# Patient Record
Sex: FEMALE | Race: WHITE | NOT HISPANIC OR LATINO | Employment: OTHER | URBAN - METROPOLITAN AREA
[De-identification: names, ages, dates, MRNs, and addresses within clinical notes are randomized per-mention and may not be internally consistent; named-entity substitution may affect disease eponyms.]

---

## 2017-12-07 ENCOUNTER — DOCUMENTATION (OUTPATIENT)
Dept: OTHER | Facility: HOSPITAL | Age: 57
End: 2017-12-07

## 2017-12-07 NOTE — PROGRESS NOTES
I saw patient with Dr SORTO and her  - Dr SORTO reviewed pathology report and surgical options as well as aliyah-adjuvant treatment- Patient imaging and biopsy was done at [facility] - HER2 is pending. The patient is preferring Mastectomy with Reconstruction - I reviewed the educational and supportive materials with the patient and completed arm measurement - given to SWAPNA Urban for EMR. Patient agreed to GRAIL Study.

## 2017-12-07 NOTE — PROGRESS NOTES
I called patient to let her know that I spoke to genetics and she does not need to see them - she said that she saw Dr Tirado at  and wants to do aliyah-adjuvant treatment so that she knows if the chemo is working or not. She has appointment to see Dr. Hudson on 12/14/17 - I will call Dr Hudson office tomorrow to see if she can be seen earlier.  AJ aware of all this.

## 2017-12-12 ENCOUNTER — CONSULT (OUTPATIENT)
Dept: ONCOLOGY | Facility: CLINIC | Age: 57
End: 2017-12-12

## 2017-12-12 VITALS
DIASTOLIC BLOOD PRESSURE: 62 MMHG | HEIGHT: 64 IN | BODY MASS INDEX: 21.17 KG/M2 | WEIGHT: 124 LBS | RESPIRATION RATE: 12 BRPM | TEMPERATURE: 97.7 F | HEART RATE: 76 BPM | SYSTOLIC BLOOD PRESSURE: 84 MMHG

## 2017-12-12 DIAGNOSIS — Z17.0 MALIGNANT NEOPLASM OF LOWER-OUTER QUADRANT OF RIGHT BREAST OF FEMALE, ESTROGEN RECEPTOR POSITIVE (HCC): Primary | ICD-10-CM

## 2017-12-12 DIAGNOSIS — C50.511 MALIGNANT NEOPLASM OF LOWER-OUTER QUADRANT OF RIGHT BREAST OF FEMALE, ESTROGEN RECEPTOR POSITIVE (HCC): Primary | ICD-10-CM

## 2017-12-12 PROBLEM — C50.519 MALIGNANT NEOPLASM OF LOWER-OUTER QUADRANT OF BREAST OF FEMALE, ESTROGEN RECEPTOR POSITIVE (HCC): Status: ACTIVE | Noted: 2017-12-12

## 2017-12-12 PROCEDURE — 99245 OFF/OP CONSLTJ NEW/EST HI 55: CPT | Performed by: INTERNAL MEDICINE

## 2017-12-12 NOTE — PROGRESS NOTES
CHIEF COMPLAINT: Management of early stage breast cancer    REASON FOR REFERRAL: Early stage breast cancer management      RECORDS OBTAINED  Records of the patients history including those obtained from  Dr. SORTO were reviewed and summarized in detail.       Malignant neoplasm of lower-outer quadrant of breast of female, estrogen receptor positive    11/28/2017 Biopsy     Right 8:00 fibrocystic breast disease with atypical ductal hyperplasia         11/29/2017 Imaging     Bilateral breast MRI showed one area of abnormality were the atypical ductal hyperplasia was found but a second area of concern was also seen and MRI guided biopsy was suggested.  There was one prominent axillary lymph node and a contralateral subcutaneous abnormality favored to be an intramammary node or an adenoma but ultrasound was suggested         11/30/2017 Biopsy     MRI guided right axillary lymph node positive for 1 cm ductal carcinoma with apocrine features.  At the 8 o'clock position in the right breast there was a grade 3 infiltrating ductal carcinoma with apocrine features and focal high-grade ductal carcinoma in situ.  Estrogen receptor was 50% positive in both the invasive and intraductal component.  CT was negative.  HER-2/allison was negative by fish.         12/12/2017 Initial Diagnosis     Malignant neoplasm of lower-outer quadrant of breast of female, estrogen receptor positive       12/12/2017 -  Research Study Participant     She previously consented to the Grail study            HISTORY OF PRESENT ILLNESS:  The patient is a 57 y.o.  female, referred for Management of early-stage breast cancer.  She had felt a mass in that right breast for 3 years with negative images but had been watched every 6 months and there was enough change in this rate sized abnormality that was causing a serous discharge daily for the last year or so that biopsy was performed.  The initial biopsy showed atypical ductal hyperplasia but an MRI the following  "day showed not only that area but a second area of concern that by MRI guidance showed a single axillary node involved that was positive for ductal carcinoma with apocrine features 1 cm in size.  In the 8 o'clock position in the right breast there was a grade 3 infiltrating ductal carcinoma with apocrine features 0.7 cm size focal high-grade DCIS ER 50% positive in both the invasive and in situ component and the progesterone receptor was negative and the HER-2/allison was negative by fish.  She comes today for discussion of adjuvant versus neoadjuvant therapy, type of surgery, radiation or not, type of hormonal manipulation, etc.  No somatic complaints to suggest metastasis.    REVIEW OF SYSTEMS:  A 14 point review of systems was performed and is negative except as noted above.    History reviewed. No pertinent past medical history.  History reviewed. No pertinent surgical history.    No current outpatient prescriptions on file prior to visit.     No current facility-administered medications on file prior to visit.        No Known Allergies    Social History     Social History   • Marital status:      Spouse name: N/A   • Number of children: N/A   • Years of education: N/A     Social History Main Topics   • Smoking status: Never Smoker   • Smokeless tobacco: None   • Alcohol use None   • Drug use: None   • Sexual activity: Not Asked     Other Topics Concern   • None     Social History Narrative   • None       Family History   Problem Relation Age of Onset   • Breast cancer Mother    • Diabetes Mother    • Hypertension Mother    • Diabetes Father        PHYSICAL EXAM:    BP (!) 84/62  Pulse 76  Temp 97.7 °F (36.5 °C)  Resp 12  Ht 161.3 cm (63.5\")  Wt 56.2 kg (124 lb)  BMI 21.62 kg/m2    ECOG: (0) Fully active, able to carry on all predisease performance without restriction  General: well appearing female in no acute distress  HEENT: sclera anicteric, oropharynx clear  Lymphatics: no cervical, " supraclavicular, inguinal, or axillary adenopathy  Cardiovascular: regular rate and rhythm, no murmurs  Neck: Supple; No thyromegaly  Lungs: clear to auscultation bilaterally. No respiratory distress.   Abdomen: soft, nontender, nondistended.  No palpable organomegaly  Extremities: no cyanosis, clubbing, edema, or cords  Skin: no rashes, lesions, bruising, or petechiae  Neuro: Alert and oriented x 4; Moving all extremities.  Psych: No anxiety or depression  Breasts: Right breast 8:00 6 cm area of mass/hematoma  No results found for any previous visit.    Assessment/Plan     1. Clinical Stage IIa T1b N1 M0 poorly differentiated grade 3 infiltrating ductal carcinoma with apocrine features and ductal carcinoma in situ single lymph node positive on biopsy and imaging    Discussion: We discussed her options for 90 minutes.  Neoadjuvant therapy it would be reasonable in her case primarily to guide us as to whether to add Xeloda following standard Adriamycin Cytoxan Taxol if she did not have a pathological CR.  His based on data from the study published this past summer [employer] published in the [place] Journal which was a phase 3 study and HER-2/allison negative patients randomized to receive Xeloda if they had residual invasive cancer after the receipt of standard neoadjuvant Adriamycin taxanes or both.  In that study, patients who failed to have a complete pathological response had a 20-30% risk of relapse compared to 13-22% with a pathological CR.  The addition of Xeloda rendered a 74% disease free survival compared to 68% in the control arm and overall survival was 89% versus 84%.  Based on discussions by phone today with Dr. SORTO, she is probably prone towards mastectomy regardless and hence there is no indication for neoadjuvant therapy for breast conservation purposes but I would argue for neoadjuvant therapy to guide the Xeloda decision.  On the other hand, based on the MINDACT trial she could consider surgery  followed by Mammaprint if she had 1-3 positive nodes and the tumor was in fact less than 3 cm in size(there is significant mass in the 8 o'clock position of the right breast now but it's hard to know how much of that is the cancer versus postbiopsy hematoma).  If she had a low risk Mammaprint then she might forego chemotherapy.  While I would prefer my initial suggestion above, I am at peace with her decision to proceed with surgery first and then base further decisions on Mammaprint on that specimen if that is her wish.  She will certainly need 5 and perhaps 10 years of Arimidex (she has a uterus in place and is postmenopausal times several years).  We can later do a breast cancer index to help guide the need for extended adjuvant but I would wait and see how she handled the hormonal blockade before running at as, were hormonal blockade horribly punitive then that becomes less of a feasible option for her.  I will check her baseline chemistry and CBC today but otherwise, in the absence of symptoms, NCCN guidelines do not suggest routine staging studies in the setting.  She will need radiation postoperatively.  She does need an axillary node dissection.  She does have a mother with breast cancer but her information was passed through our genetic counselors and it was deemed that she would not be appropriate for genetic testing.  She has signed consent for submission of her tissue for the study.  I will see her back postoperatively to talk further about subsequent adjuvant therapies based on the results of subsequent findings pathologically.  She sees plastic surgery this afternoon.  She is contemplating bilateral mastectomies.  If she proceeds with that than she needs no further preoperative breast imaging but if she plans on doing a right mastectomy with out prophylactic left mastectomy (a prospect which I told her was entirely appropriate) then we need to get ultrasound of the contralateral breast before the right  mastectomy and I asked her to reinforce to Dr. SORTO the need for that and I spoken with him by phone today while she was in the room regarding that issue.  The MRI report is highly suggestive that this is a benign process in the left breast but ultrasound was nonetheless recommended and needs to be completed if that left breast is not taken at the time of surgery.  Radhames Hudson MD    12/12/2017

## 2017-12-18 ENCOUNTER — TRANSCRIBE ORDERS (OUTPATIENT)
Dept: ADMINISTRATIVE | Facility: HOSPITAL | Age: 57
End: 2017-12-18

## 2017-12-18 ENCOUNTER — APPOINTMENT (OUTPATIENT)
Dept: PREADMISSION TESTING | Facility: HOSPITAL | Age: 57
End: 2017-12-18

## 2017-12-18 VITALS — HEIGHT: 62 IN | BODY MASS INDEX: 22.92 KG/M2 | WEIGHT: 124.56 LBS

## 2017-12-18 DIAGNOSIS — C50.511 MALIGNANT NEOPLASM OF LOWER-OUTER QUADRANT OF RIGHT FEMALE BREAST, UNSPECIFIED ESTROGEN RECEPTOR STATUS (HCC): Primary | ICD-10-CM

## 2017-12-18 LAB
ALBUMIN SERPL-MCNC: 4.3 G/DL (ref 3.2–4.8)
ALBUMIN/GLOB SERPL: 1.6 G/DL (ref 1.5–2.5)
ALP SERPL-CCNC: 67 U/L (ref 25–100)
ALT SERPL W P-5'-P-CCNC: 13 U/L (ref 7–40)
ANION GAP SERPL CALCULATED.3IONS-SCNC: 6 MMOL/L (ref 3–11)
AST SERPL-CCNC: 14 U/L (ref 0–33)
BILIRUB SERPL-MCNC: 0.4 MG/DL (ref 0.3–1.2)
BUN BLD-MCNC: 12 MG/DL (ref 9–23)
BUN/CREAT SERPL: 15 (ref 7–25)
CALCIUM SPEC-SCNC: 9.2 MG/DL (ref 8.7–10.4)
CHLORIDE SERPL-SCNC: 103 MMOL/L (ref 99–109)
CO2 SERPL-SCNC: 31 MMOL/L (ref 20–31)
CREAT BLD-MCNC: 0.8 MG/DL (ref 0.6–1.3)
DEPRECATED RDW RBC AUTO: 41.5 FL (ref 37–54)
ERYTHROCYTE [DISTWIDTH] IN BLOOD BY AUTOMATED COUNT: 12.9 % (ref 11.3–14.5)
GFR SERPL CREATININE-BSD FRML MDRD: 74 ML/MIN/1.73
GLOBULIN UR ELPH-MCNC: 2.7 GM/DL
GLUCOSE BLD-MCNC: 132 MG/DL (ref 70–100)
HCT VFR BLD AUTO: 41.1 % (ref 34.5–44)
HGB BLD-MCNC: 13.3 G/DL (ref 11.5–15.5)
MCH RBC QN AUTO: 28.9 PG (ref 27–31)
MCHC RBC AUTO-ENTMCNC: 32.4 G/DL (ref 32–36)
MCV RBC AUTO: 89.2 FL (ref 80–99)
PLATELET # BLD AUTO: 280 10*3/MM3 (ref 150–450)
PMV BLD AUTO: 9.6 FL (ref 6–12)
POTASSIUM BLD-SCNC: 4.2 MMOL/L (ref 3.5–5.5)
PROT SERPL-MCNC: 7 G/DL (ref 5.7–8.2)
RBC # BLD AUTO: 4.61 10*6/MM3 (ref 3.89–5.14)
SODIUM BLD-SCNC: 140 MMOL/L (ref 132–146)
WBC NRBC COR # BLD: 6.51 10*3/MM3 (ref 3.5–10.8)

## 2017-12-18 PROCEDURE — 36415 COLL VENOUS BLD VENIPUNCTURE: CPT

## 2017-12-18 PROCEDURE — 80053 COMPREHEN METABOLIC PANEL: CPT | Performed by: SURGERY

## 2017-12-18 PROCEDURE — 85027 COMPLETE CBC AUTOMATED: CPT | Performed by: SURGERY

## 2017-12-19 ENCOUNTER — ANESTHESIA EVENT (OUTPATIENT)
Dept: PERIOP | Facility: HOSPITAL | Age: 57
End: 2017-12-19

## 2017-12-19 ENCOUNTER — ANESTHESIA (OUTPATIENT)
Dept: PERIOP | Facility: HOSPITAL | Age: 57
End: 2017-12-19

## 2017-12-19 ENCOUNTER — HOSPITAL ENCOUNTER (OUTPATIENT)
Facility: HOSPITAL | Age: 57
Discharge: HOME | End: 2017-12-20
Attending: SURGERY | Admitting: SURGERY

## 2017-12-19 ENCOUNTER — HOSPITAL ENCOUNTER (OUTPATIENT)
Dept: NUCLEAR MEDICINE | Facility: HOSPITAL | Age: 57
Discharge: HOME | End: 2017-12-19
Attending: SURGERY

## 2017-12-19 DIAGNOSIS — C50.511 MALIGNANT NEOPLASM OF LOWER-OUTER QUADRANT OF RIGHT FEMALE BREAST, UNSPECIFIED ESTROGEN RECEPTOR STATUS (HCC): ICD-10-CM

## 2017-12-19 DIAGNOSIS — C50.919 BREAST CA (HCC): ICD-10-CM

## 2017-12-19 LAB
B-HCG UR QL: NEGATIVE
INTERNAL NEGATIVE CONTROL: NORMAL
INTERNAL POSITIVE CONTROL: REACTIVE
Lab: NORMAL

## 2017-12-19 PROCEDURE — 88307 TISSUE EXAM BY PATHOLOGIST: CPT | Performed by: SURGERY

## 2017-12-19 PROCEDURE — 25010000002 BUPRENORPHINE PER 0.1 MG: Performed by: NURSE ANESTHETIST, CERTIFIED REGISTERED

## 2017-12-19 PROCEDURE — 25010000002 FENTANYL CITRATE (PF) 100 MCG/2ML SOLUTION: Performed by: NURSE ANESTHETIST, CERTIFIED REGISTERED

## 2017-12-19 PROCEDURE — 25010000003 CEFAZOLIN IN DEXTROSE 2-4 GM/100ML-% SOLUTION: Performed by: SURGERY

## 2017-12-19 PROCEDURE — 25010000002 ONDANSETRON PER 1 MG: Performed by: NURSE ANESTHETIST, CERTIFIED REGISTERED

## 2017-12-19 PROCEDURE — 88360 TUMOR IMMUNOHISTOCHEM/MANUAL: CPT | Performed by: SURGERY

## 2017-12-19 PROCEDURE — 25010000002 PROPOFOL 1000 MG/ML EMULSION: Performed by: NURSE ANESTHETIST, CERTIFIED REGISTERED

## 2017-12-19 PROCEDURE — 25010000002 PROPOFOL 10 MG/ML EMULSION: Performed by: NURSE ANESTHETIST, CERTIFIED REGISTERED

## 2017-12-19 PROCEDURE — 25010000002 DEXAMETHASONE PER 1 MG: Performed by: NURSE ANESTHETIST, CERTIFIED REGISTERED

## 2017-12-19 PROCEDURE — 88305 TISSUE EXAM BY PATHOLOGIST: CPT | Performed by: SURGERY

## 2017-12-19 PROCEDURE — 25010000002 DEXAMETHASONE SODIUM PHOSPHATE 10 MG/ML SOLUTION 1 ML VIAL: Performed by: NURSE ANESTHETIST, CERTIFIED REGISTERED

## 2017-12-19 PROCEDURE — 25010000002 MIDAZOLAM PER 1 MG: Performed by: NURSE ANESTHETIST, CERTIFIED REGISTERED

## 2017-12-19 PROCEDURE — C1789 PROSTHESIS, BREAST, IMP: HCPCS | Performed by: SURGERY

## 2017-12-19 PROCEDURE — 25010000002 NEOSTIGMINE 10 MG/10ML SOLUTION: Performed by: NURSE ANESTHETIST, CERTIFIED REGISTERED

## 2017-12-19 DEVICE — IMPLANTABLE DEVICE: Type: IMPLANTABLE DEVICE | Site: BREAST | Status: FUNCTIONAL

## 2017-12-19 DEVICE — GRFT TISS ALLODERM RTM PERF CONTR 132SQ/CM THN MD: Type: IMPLANTABLE DEVICE | Site: BREAST | Status: FUNCTIONAL

## 2017-12-19 RX ORDER — SODIUM CHLORIDE, SODIUM LACTATE, POTASSIUM CHLORIDE, CALCIUM CHLORIDE 600; 310; 30; 20 MG/100ML; MG/100ML; MG/100ML; MG/100ML
9 INJECTION, SOLUTION INTRAVENOUS CONTINUOUS
Status: DISCONTINUED | OUTPATIENT
Start: 2017-12-19 | End: 2017-12-20 | Stop reason: HOSPADM

## 2017-12-19 RX ORDER — OXYCODONE HYDROCHLORIDE 5 MG/1
5 TABLET ORAL EVERY 4 HOURS PRN
Status: DISCONTINUED | OUTPATIENT
Start: 2017-12-19 | End: 2017-12-20 | Stop reason: HOSPADM

## 2017-12-19 RX ORDER — PROMETHAZINE HYDROCHLORIDE 25 MG/ML
6.25 INJECTION, SOLUTION INTRAMUSCULAR; INTRAVENOUS ONCE AS NEEDED
Status: DISCONTINUED | OUTPATIENT
Start: 2017-12-19 | End: 2017-12-19 | Stop reason: HOSPADM

## 2017-12-19 RX ORDER — DIPHENHYDRAMINE HYDROCHLORIDE 50 MG/ML
12.5 INJECTION INTRAMUSCULAR; INTRAVENOUS EVERY 6 HOURS PRN
Status: DISCONTINUED | OUTPATIENT
Start: 2017-12-19 | End: 2017-12-20 | Stop reason: HOSPADM

## 2017-12-19 RX ORDER — ONDANSETRON 2 MG/ML
INJECTION INTRAMUSCULAR; INTRAVENOUS AS NEEDED
Status: DISCONTINUED | OUTPATIENT
Start: 2017-12-19 | End: 2017-12-19 | Stop reason: SURG

## 2017-12-19 RX ORDER — MAGNESIUM HYDROXIDE 1200 MG/15ML
LIQUID ORAL AS NEEDED
Status: DISCONTINUED | OUTPATIENT
Start: 2017-12-19 | End: 2017-12-19 | Stop reason: HOSPADM

## 2017-12-19 RX ORDER — SCOLOPAMINE TRANSDERMAL SYSTEM 1 MG/1
1 PATCH, EXTENDED RELEASE TRANSDERMAL ONCE
Status: DISCONTINUED | OUTPATIENT
Start: 2017-12-19 | End: 2017-12-19

## 2017-12-19 RX ORDER — GLYCOPYRROLATE 0.2 MG/ML
INJECTION INTRAMUSCULAR; INTRAVENOUS AS NEEDED
Status: DISCONTINUED | OUTPATIENT
Start: 2017-12-19 | End: 2017-12-19 | Stop reason: SURG

## 2017-12-19 RX ORDER — PROMETHAZINE HYDROCHLORIDE 25 MG/1
25 SUPPOSITORY RECTAL ONCE AS NEEDED
Status: DISCONTINUED | OUTPATIENT
Start: 2017-12-19 | End: 2017-12-19 | Stop reason: HOSPADM

## 2017-12-19 RX ORDER — SODIUM CHLORIDE 9 MG/ML
INJECTION, SOLUTION INTRAVENOUS AS NEEDED
Status: DISCONTINUED | OUTPATIENT
Start: 2017-12-19 | End: 2017-12-19 | Stop reason: HOSPADM

## 2017-12-19 RX ORDER — SODIUM CHLORIDE 9 MG/ML
75 INJECTION, SOLUTION INTRAVENOUS CONTINUOUS
Status: DISCONTINUED | OUTPATIENT
Start: 2017-12-19 | End: 2017-12-20 | Stop reason: HOSPADM

## 2017-12-19 RX ORDER — ACETAMINOPHEN 500 MG
1000 TABLET ORAL EVERY 6 HOURS
Status: DISCONTINUED | OUTPATIENT
Start: 2017-12-19 | End: 2017-12-20 | Stop reason: HOSPADM

## 2017-12-19 RX ORDER — LIDOCAINE HYDROCHLORIDE 10 MG/ML
INJECTION, SOLUTION EPIDURAL; INFILTRATION; INTRACAUDAL; PERINEURAL AS NEEDED
Status: DISCONTINUED | OUTPATIENT
Start: 2017-12-19 | End: 2017-12-19 | Stop reason: SURG

## 2017-12-19 RX ORDER — LIDOCAINE HYDROCHLORIDE 10 MG/ML
0.5 INJECTION, SOLUTION EPIDURAL; INFILTRATION; INTRACAUDAL; PERINEURAL ONCE AS NEEDED
Status: DISCONTINUED | OUTPATIENT
Start: 2017-12-19 | End: 2017-12-19 | Stop reason: HOSPADM

## 2017-12-19 RX ORDER — ESMOLOL HYDROCHLORIDE 10 MG/ML
INJECTION INTRAVENOUS AS NEEDED
Status: DISCONTINUED | OUTPATIENT
Start: 2017-12-19 | End: 2017-12-19 | Stop reason: SURG

## 2017-12-19 RX ORDER — PROPOFOL 10 MG/ML
VIAL (ML) INTRAVENOUS AS NEEDED
Status: DISCONTINUED | OUTPATIENT
Start: 2017-12-19 | End: 2017-12-19 | Stop reason: SURG

## 2017-12-19 RX ORDER — ROCURONIUM BROMIDE 10 MG/ML
INJECTION, SOLUTION INTRAVENOUS AS NEEDED
Status: DISCONTINUED | OUTPATIENT
Start: 2017-12-19 | End: 2017-12-19 | Stop reason: SURG

## 2017-12-19 RX ORDER — CEFAZOLIN SODIUM 2 G/100ML
2 INJECTION, SOLUTION INTRAVENOUS EVERY 8 HOURS
Status: COMPLETED | OUTPATIENT
Start: 2017-12-19 | End: 2017-12-20

## 2017-12-19 RX ORDER — HYDROMORPHONE HYDROCHLORIDE 1 MG/ML
0.5 INJECTION, SOLUTION INTRAMUSCULAR; INTRAVENOUS; SUBCUTANEOUS
Status: DISCONTINUED | OUTPATIENT
Start: 2017-12-19 | End: 2017-12-19 | Stop reason: HOSPADM

## 2017-12-19 RX ORDER — PREGABALIN 75 MG/1
75 CAPSULE ORAL ONCE
Status: COMPLETED | OUTPATIENT
Start: 2017-12-19 | End: 2017-12-19

## 2017-12-19 RX ORDER — BUPIVACAINE HYDROCHLORIDE 2.5 MG/ML
INJECTION, SOLUTION EPIDURAL; INFILTRATION; INTRACAUDAL AS NEEDED
Status: DISCONTINUED | OUTPATIENT
Start: 2017-12-19 | End: 2017-12-19 | Stop reason: HOSPADM

## 2017-12-19 RX ORDER — CARISOPRODOL 350 MG/1
350 TABLET ORAL EVERY 8 HOURS PRN
Status: DISCONTINUED | OUTPATIENT
Start: 2017-12-19 | End: 2017-12-20 | Stop reason: HOSPADM

## 2017-12-19 RX ORDER — FAMOTIDINE 10 MG/ML
20 INJECTION, SOLUTION INTRAVENOUS ONCE
Status: CANCELLED | OUTPATIENT
Start: 2017-12-19 | End: 2017-12-19

## 2017-12-19 RX ORDER — PROMETHAZINE HYDROCHLORIDE 25 MG/1
25 TABLET ORAL ONCE AS NEEDED
Status: DISCONTINUED | OUTPATIENT
Start: 2017-12-19 | End: 2017-12-19 | Stop reason: HOSPADM

## 2017-12-19 RX ORDER — DEXAMETHASONE SODIUM PHOSPHATE 4 MG/ML
INJECTION, SOLUTION INTRA-ARTICULAR; INTRALESIONAL; INTRAMUSCULAR; INTRAVENOUS; SOFT TISSUE AS NEEDED
Status: DISCONTINUED | OUTPATIENT
Start: 2017-12-19 | End: 2017-12-19 | Stop reason: SURG

## 2017-12-19 RX ORDER — MIDAZOLAM HYDROCHLORIDE 1 MG/ML
INJECTION INTRAMUSCULAR; INTRAVENOUS AS NEEDED
Status: DISCONTINUED | OUTPATIENT
Start: 2017-12-19 | End: 2017-12-19 | Stop reason: SURG

## 2017-12-19 RX ORDER — FAMOTIDINE 20 MG/1
20 TABLET, FILM COATED ORAL ONCE
Status: COMPLETED | OUTPATIENT
Start: 2017-12-19 | End: 2017-12-19

## 2017-12-19 RX ORDER — HYDROMORPHONE HYDROCHLORIDE 1 MG/ML
0.3 INJECTION, SOLUTION INTRAMUSCULAR; INTRAVENOUS; SUBCUTANEOUS
Status: DISCONTINUED | OUTPATIENT
Start: 2017-12-19 | End: 2017-12-20 | Stop reason: HOSPADM

## 2017-12-19 RX ORDER — PROMETHAZINE HYDROCHLORIDE 25 MG/ML
6.25 INJECTION, SOLUTION INTRAMUSCULAR; INTRAVENOUS EVERY 6 HOURS PRN
Status: DISCONTINUED | OUTPATIENT
Start: 2017-12-19 | End: 2017-12-20 | Stop reason: HOSPADM

## 2017-12-19 RX ORDER — NALOXONE HCL 0.4 MG/ML
0.1 VIAL (ML) INJECTION
Status: DISCONTINUED | OUTPATIENT
Start: 2017-12-19 | End: 2017-12-20 | Stop reason: HOSPADM

## 2017-12-19 RX ORDER — FENTANYL CITRATE 50 UG/ML
50 INJECTION, SOLUTION INTRAMUSCULAR; INTRAVENOUS
Status: DISCONTINUED | OUTPATIENT
Start: 2017-12-19 | End: 2017-12-19 | Stop reason: HOSPADM

## 2017-12-19 RX ORDER — CELECOXIB 200 MG/1
200 CAPSULE ORAL ONCE
Status: COMPLETED | OUTPATIENT
Start: 2017-12-19 | End: 2017-12-19

## 2017-12-19 RX ORDER — NEOSTIGMINE METHYLSULFATE 1 MG/ML
INJECTION, SOLUTION INTRAVENOUS AS NEEDED
Status: DISCONTINUED | OUTPATIENT
Start: 2017-12-19 | End: 2017-12-19 | Stop reason: SURG

## 2017-12-19 RX ORDER — CELECOXIB 200 MG/1
200 CAPSULE ORAL EVERY 12 HOURS
Status: DISCONTINUED | OUTPATIENT
Start: 2017-12-19 | End: 2017-12-20 | Stop reason: HOSPADM

## 2017-12-19 RX ORDER — ONDANSETRON 2 MG/ML
4 INJECTION INTRAMUSCULAR; INTRAVENOUS ONCE AS NEEDED
Status: DISCONTINUED | OUTPATIENT
Start: 2017-12-19 | End: 2017-12-19 | Stop reason: HOSPADM

## 2017-12-19 RX ORDER — SODIUM CHLORIDE 0.9 % (FLUSH) 0.9 %
1-10 SYRINGE (ML) INJECTION AS NEEDED
Status: DISCONTINUED | OUTPATIENT
Start: 2017-12-19 | End: 2017-12-19 | Stop reason: HOSPADM

## 2017-12-19 RX ORDER — CEFAZOLIN SODIUM 2 G/100ML
2 INJECTION, SOLUTION INTRAVENOUS ONCE
Status: COMPLETED | OUTPATIENT
Start: 2017-12-19 | End: 2017-12-19

## 2017-12-19 RX ORDER — ACETAMINOPHEN 500 MG
1000 TABLET ORAL ONCE
Status: COMPLETED | OUTPATIENT
Start: 2017-12-19 | End: 2017-12-19

## 2017-12-19 RX ORDER — ONDANSETRON 2 MG/ML
4 INJECTION INTRAMUSCULAR; INTRAVENOUS EVERY 6 HOURS PRN
Status: DISCONTINUED | OUTPATIENT
Start: 2017-12-19 | End: 2017-12-20 | Stop reason: HOSPADM

## 2017-12-19 RX ADMIN — PROPOFOL 188 MCG/KG/MIN: 10 INJECTION, EMULSION INTRAVENOUS at 07:09

## 2017-12-19 RX ADMIN — ACETAMINOPHEN 1000 MG: 500 TABLET ORAL at 21:05

## 2017-12-19 RX ADMIN — ACETAMINOPHEN 1000 MG: 500 TABLET ORAL at 16:02

## 2017-12-19 RX ADMIN — FAMOTIDINE 20 MG: 20 TABLET, FILM COATED ORAL at 06:40

## 2017-12-19 RX ADMIN — FENTANYL CITRATE 25 MCG: 50 INJECTION INTRAMUSCULAR; INTRAVENOUS at 12:49

## 2017-12-19 RX ADMIN — ROCURONIUM BROMIDE 40 MG: 10 INJECTION INTRAVENOUS at 07:08

## 2017-12-19 RX ADMIN — NEOSTIGMINE METHYLSULFATE 2 MG: 1 INJECTION, SOLUTION INTRAVENOUS at 10:02

## 2017-12-19 RX ADMIN — SCOPALAMINE 1 PATCH: 1 PATCH, EXTENDED RELEASE TRANSDERMAL at 06:39

## 2017-12-19 RX ADMIN — ESMOLOL HYDROCHLORIDE 20 MG: 10 INJECTION, SOLUTION INTRAVENOUS at 07:09

## 2017-12-19 RX ADMIN — FENTANYL CITRATE 50 MCG: 50 INJECTION INTRAMUSCULAR; INTRAVENOUS at 13:43

## 2017-12-19 RX ADMIN — ROCURONIUM BROMIDE 10 MG: 10 INJECTION INTRAVENOUS at 07:50

## 2017-12-19 RX ADMIN — DEXAMETHASONE SODIUM PHOSPHATE 4 MG: 4 INJECTION, SOLUTION INTRAMUSCULAR; INTRAVENOUS at 07:15

## 2017-12-19 RX ADMIN — DEXAMETHASONE SODIUM PHOSPHATE 30 ML: 10 INJECTION, SOLUTION INTRAMUSCULAR; INTRAVENOUS at 07:09

## 2017-12-19 RX ADMIN — CEFAZOLIN SODIUM 2 G: 2 INJECTION, SOLUTION INTRAVENOUS at 17:11

## 2017-12-19 RX ADMIN — SODIUM CHLORIDE, POTASSIUM CHLORIDE, SODIUM LACTATE AND CALCIUM CHLORIDE: 600; 310; 30; 20 INJECTION, SOLUTION INTRAVENOUS at 07:05

## 2017-12-19 RX ADMIN — ACETAMINOPHEN 1000 MG: 500 TABLET ORAL at 06:40

## 2017-12-19 RX ADMIN — CELECOXIB 200 MG: 200 CAPSULE ORAL at 16:02

## 2017-12-19 RX ADMIN — LIDOCAINE HYDROCHLORIDE 50 MG: 10 INJECTION, SOLUTION EPIDURAL; INFILTRATION; INTRACAUDAL; PERINEURAL at 07:08

## 2017-12-19 RX ADMIN — SODIUM CHLORIDE 75 ML/HR: 9 INJECTION, SOLUTION INTRAVENOUS at 23:23

## 2017-12-19 RX ADMIN — MIDAZOLAM HYDROCHLORIDE 2 MG: 1 INJECTION, SOLUTION INTRAMUSCULAR; INTRAVENOUS at 07:05

## 2017-12-19 RX ADMIN — OXYCODONE HYDROCHLORIDE 5 MG: 5 TABLET ORAL at 23:26

## 2017-12-19 RX ADMIN — FENTANYL CITRATE 25 MCG: 50 INJECTION INTRAMUSCULAR; INTRAVENOUS at 12:39

## 2017-12-19 RX ADMIN — PREGABALIN 75 MG: 75 CAPSULE ORAL at 06:40

## 2017-12-19 RX ADMIN — SODIUM CHLORIDE 75 ML/HR: 9 INJECTION, SOLUTION INTRAVENOUS at 11:57

## 2017-12-19 RX ADMIN — PROPOFOL 130 MG: 10 INJECTION, EMULSION INTRAVENOUS at 07:08

## 2017-12-19 RX ADMIN — CEFAZOLIN SODIUM 2 G: 2 INJECTION, SOLUTION INTRAVENOUS at 09:57

## 2017-12-19 RX ADMIN — CEFAZOLIN SODIUM 2 G: 2 INJECTION, SOLUTION INTRAVENOUS at 07:11

## 2017-12-19 RX ADMIN — CELECOXIB 200 MG: 200 CAPSULE ORAL at 06:50

## 2017-12-19 RX ADMIN — SODIUM CHLORIDE, POTASSIUM CHLORIDE, SODIUM LACTATE AND CALCIUM CHLORIDE: 600; 310; 30; 20 INJECTION, SOLUTION INTRAVENOUS at 09:20

## 2017-12-19 RX ADMIN — ONDANSETRON 4 MG: 2 INJECTION INTRAMUSCULAR; INTRAVENOUS at 09:57

## 2017-12-19 RX ADMIN — OXYCODONE HYDROCHLORIDE 5 MG: 5 TABLET ORAL at 15:22

## 2017-12-19 RX ADMIN — GLYCOPYRROLATE 0.2 MG: 0.2 INJECTION, SOLUTION INTRAMUSCULAR; INTRAVENOUS at 10:02

## 2017-12-19 NOTE — OP NOTE
Operative Note    Date of Surgery:  12/19/2017    Pre-Operative Diagnosis: Right breast cancer with axillary lymph node metastases    Post-Operative Diagnosis: Same    Procedure:  Right nipple sparing mastectomy and axillary node dissection    Anesthesia:  General    Surgeon:  Cristobal Luna MD    Assistant:  LORY Rivera    Estimated Blood Loss:  Very minimal    Complications: None      Indication for Procedure: Mrs. Burroughs is a healthy 57 years old lady who was recently diagnosed with grade 3 invasive ductal carcinoma in the right breast with concern about a large focus of DCIS as well as biopsy of the axillary lymph node that showed metastases.  She presents today for the above procedure with plans to proceed with immediate reconstruction by Dr. Butler.    Procedure: Patient was taken to the operative anesthesia and placed supine on the table.  Following induction of general ventricular anesthesia, a Duran catheter was placed.  She received 2 g of Kefzol IV.  Anesthesia placed ultrasound-guided pectoralis nerve block on the right side.  SCDs were placed.  The right breast, chest, axilla and upper arm were then prepped and draped in a sterile fashion.  Timeout was observed.  The lateral inframammary incision was made.  The superior flap including the areola nipple complex was dissected away from the breast tissue, maintaining adequate thickness of the skin.  An inferior flap was also raised. We then proceeded by removing the breast off the underlying pectoralis major muscle taking the fascia along with the specimen starting medially and proceeding laterally.  Some of the larger vessels of the vas were ligated with three-vessel suture.  Once in the axilla we appreciated larger nodes. Theses were removed en bloc with the specimen.  The specimen was marked with a long suture laterally and a short silk suture at the nipple site.  It was then sent fresh to pathology.  2 other palpable nodes were appreciated in the  axilla.  These were excised and sent for permanent section as well.  Note that the long thoracic and thoracodorsal nerves were preserved.  The wound was then irrigated with water with clear return of fluid noted.  The flaps and the nipple areolar comple were viable.  At this time Dr. Butler proceeded with immediate reconstruction.  The patient was doing relatively well with anesthesia.  Sponge count and needle count were correct at the end of the procedure.     Cristobal Luna MD  12/19/17  2:42 PM

## 2017-12-19 NOTE — OP NOTE
Jannette Burroughs  [id number]  1960    Date of Procedure: 12/19/2017    Preoperative Diagnosis: Acquired Absence of right breast, right breast cancer    Postoperative Diagnosis: same    Surgeon: Dottie Butler MD    Assistant: LORY Cruz    Anesthesia: General, pecs blocks    Estimated Blood Loss: less than 10ml    Complications: none      HPI/Indications: The patient is a 57 year old with right sided breast cancer who underwent bilateral right today and presents for immediate reconstruction. The risks, benefits and alternatives to all forms of breast recosntruction including but not limited to the risk of skin necrosis and infection were discussed with the aptient who verbalizes full understanding and wishes to proceed with immediate staged right breast recosntruction with expander and alloderm.      Description of the procedure/findings: The patient underwent right mastectomy. Immediately following, I scrubbed into the case and redraped over the existing drapes. Right subpectoral pocket was created and an expanders was placed and sutured with 3-0 pds to secure the inferior suture tabs to the fascia. I then sutured perforated contour alloderm to cover the lower half of the expander. A drain was used to drain the subcutaneous pocket. We then closed the incision after irrigation with sterile saline solution. The skin edges were approximated with deep dermal 3-0 vicryl and a running subcuticular 4-0 Monocryl.  The area was cleaned, dried and dressed with skin affix and steri strips. She tolerated the procedure well and was awakened in the operating room and transferred to the recovery room in good condition. All needle, sponge and instrument counts were correct x 2 at the end of the procedure.       Dottie Butler MD  12/19/17  10:20 AM

## 2017-12-19 NOTE — INTERVAL H&P NOTE
[facility] Pre-op    Full history and physical note from office is up to date.  See office note attached.    Afebrile HR 74 O2 95% 109/72  IMM:  Influenza:  no  Pneumococcal:  no  Tetanus:  unknown    Cancer Staging (if applicable)  Cancer Patient: _x_ yes __no __unknown__N/A; If yes, clinical stage T:_1_ N:_1_M:_x_, stage group II  (See attached H&P for staging)    Dee Jiménez, APRN 12/19/2017 6:21 AM

## 2017-12-19 NOTE — ANESTHESIA PREPROCEDURE EVALUATION
Anesthesia Evaluation     NPO Solid Status: > 8 hours  NPO Liquid Status: > 8 hours     Airway   Mallampati: II  TM distance: >3 FB  no difficulty expected  Dental - normal exam     Pulmonary - normal exam   (-) asthma, not a smoker  Cardiovascular     Rhythm: regular  Rate: normal    (-) hypertension, angina, murmur, cardiac stents, CABG      Neuro/Psych  (-) seizures, TIA, CVA  GI/Hepatic/Renal/Endo    (-) liver disease, no renal disease, diabetes    Musculoskeletal     Abdominal    Substance History      OB/GYN          Other                                        Anesthesia Plan    ASA 1     general   (GA, possible PECS block if Dr. SORTO requests)  intravenous induction   Anesthetic plan and risks discussed with patient.    Plan discussed with CRNA.

## 2017-12-19 NOTE — ANESTHESIA PROCEDURE NOTES
Airway  Urgency: elective    Airway not difficult    General Information and Staff    Patient location during procedure: OR  Anesthesiologist: ANDREW PAULSON  CRNA: ANA ALEXANDRE    Indications and Patient Condition  Indications for airway management: airway protection    Preoxygenated: yes  MILS not maintained throughout  Mask difficulty assessment: 1 - vent by mask    Final Airway Details  Final airway type: endotracheal airway      Successful airway: ETT  Cuffed: yes   Successful intubation technique: direct laryngoscopy  Endotracheal tube insertion site: oral  Blade: Alla  Blade size: #3  ETT size: 6.5 mm  Cormack-Lehane Classification: grade IIb - view of arytenoids or posterior of glottis only  Placement verified by: chest auscultation and capnometry   Measured from: lips  ETT to lips (cm): 20  Number of attempts at approach: 1    Additional Comments  Pre-oxygenated to ETO2 of 70%.  Post intubation:  Mucosa/dentition unchanged.  Negative epigastric sounds, Breath sound equal bilaterally with symmetric chest rise and fall

## 2017-12-19 NOTE — ANESTHESIA POSTPROCEDURE EVALUATION
Patient: Jannette Burroughs    Procedure Summary     Date Anesthesia Start Anesthesia Stop Room / Location    12/19/17 0705   [facility] 09 / [facility]       Procedure Diagnosis Surgeon Provider    RIGHT NIPPLE SPARING BREAST MASTECTOMY WITH AXILLARY NODE DISSECTION (Right Breast); IMMEDIATE STAGED RIGHT BREAST RECONSTRUCTION WITH TISSUE EXPANDER AND ALLODERM (Right Breast) Carcinoma of breast upper outer quadrant, right Cristobal Luna MD; MD Rajesh Malik MD          Anesthesia Type: general  Last vitals  BP   117/63 (12/19/17 1029)   Temp   97.4 °F (36.3 °C) (12/19/17 1029)   Pulse   58 (12/19/17 1029)   Resp   16 (12/19/17 1029)     SpO2   97 % (12/19/17 1029)     Post Anesthesia Care and Evaluation    Patient location during evaluation: PACU  Patient participation: complete - patient participated  Level of consciousness: sleepy but conscious  Pain score: 0  Pain management: adequate  Airway patency: patent  Anesthetic complications: No anesthetic complications  PONV Status: none  Cardiovascular status: hemodynamically stable and acceptable  Respiratory status: nonlabored ventilation, acceptable and nasal cannula  Hydration status: acceptable

## 2017-12-19 NOTE — ANESTHESIA PROCEDURE NOTES
Peripheral Block    Patient location during procedure: OR  Start time: 12/19/2017 7:09 AM  Stop time: 12/19/2017 7:09 AM  Reason for block: at surgeon's request and post-op pain management  Performed by  Anesthesiologist: ANDREW PAULSON  CRNA: BALJEET SELLERS  Preanesthetic Checklist  Completed: patient identified, site marked, surgical consent, pre-op evaluation, timeout performed, IV checked, risks and benefits discussed and monitors and equipment checked  Prep:  Sterile barriers:cap, gloves, gown and mask  Prep: ChloraPrep  Patient monitoring: blood pressure monitoring, continuous pulse oximetry and EKG  Procedure    Guidance:ultrasound guided and landmark technique  Images:still images obtained    Laterality:right  Block Type:PECS I and PECS II  Injection Technique:single-shot  Needle Type:short-bevel  Needle Gauge:20 G    Medications  Preservative Free Saline:10ml  Comment:Block Injection:  Total volume of LA divided between Right and Left sided blocks       Adjuncts:   Buprenorphine 0.30 mcg ,Decadron 4 mg PSF  Local Injected:bupivacaine 0.25% Local Amount Injected:60mL  Post Assessment  Injection Assessment: negative aspiration for heme and incremental injection  Patient Tolerance:comfortable throughout block  Complications:no  Additional Notes  The pt. Was placed in the Supine Position and GA was induced     The insertion site was prepped with CHG and Ultrasound guidance with In-Plane techniquewas  a 4inch BBraun 360 degree echogenic needle was visualized.  Normal Saline PSF was  utilized for hydrodissection of tissue. PECS 1 Block- Pectoralis Major and Minor where identified and LA was injected between PMM and PmM at the level of the 3rd Rib(10ml),  PECS 2-  Pectoralis Minor and Serratus muscle where identified and the needle was advanced laterally in-plane with the 4th rib as a backstop, pleura was monitored.  LA was injected between SA and PmM at the level of 4th rib( 20ml).  LA injection spread was  visualized, injection was incremental 1-5ml, normal or low injection pressure, no intravascular injection, no pneumothorax appreciated.  Thank You.

## 2017-12-19 NOTE — BRIEF OP NOTE
BREAST MASTECTOMY WITH SENTINEL NODE BIOPSY AND AXILLARY NODE DISSECTION  Progress Note    Jannette Heike  12/19/2017    Pre-op Diagnosis:   Right breast cancer       Post-Op Diagnosis Codes:     * Carcinoma of breast upper outer quadrant, right [C50.411]    Procedure/CPT® Codes:      Procedure(s):  RIGHT NIPPLE SPARING BREAST MASTECTOMY WITH AXILLARY NODE DISSECTION  IMMEDIATE STAGED RIGHT BREAST RECONSTRUCTION WITH TISSUE EXPANDER AND ALLODERM    Surgeon(s):  MD Cristobal Malik MD    Anesthesia: General    Staff:   Circulator: Zaria Tan RN  Scrub Person: Oneida Hanson  Nursing Assistant: Keri Culp  Assistant: LORY Fajardo  [place]ee: Jodi Hall RN    Estimated Blood Loss: minimal    Urine Voided: 20 mL    Specimens:                  ID Type Source Tests Collected by Time Destination   A : long suture lateral, short suture nipple Tissue Breast, Right TISSUE EXAM Cristobal Luna MD 12/19/2017 0900    B : right axillary node Tissue Lymph Node TISSUE EXAM Cristobal Luna MD 12/19/2017 0905          Drains:   Urethral Catheter 12/19/17 0715 100% silicone 16 10 10 (Active)               Complications: none      Cristobal Luna MD     Date: 12/19/2017  Time: 9:14 AM

## 2017-12-20 VITALS
WEIGHT: 124 LBS | HEIGHT: 62 IN | TEMPERATURE: 98.4 F | DIASTOLIC BLOOD PRESSURE: 51 MMHG | SYSTOLIC BLOOD PRESSURE: 89 MMHG | BODY MASS INDEX: 22.82 KG/M2 | HEART RATE: 63 BPM | RESPIRATION RATE: 18 BRPM | OXYGEN SATURATION: 94 %

## 2017-12-20 PROCEDURE — 25010000003 CEFAZOLIN IN DEXTROSE 2-4 GM/100ML-% SOLUTION: Performed by: SURGERY

## 2017-12-20 RX ORDER — ASPIRIN 81 MG/1
81 TABLET, CHEWABLE ORAL DAILY
Qty: 30 TABLET | Refills: 11 | Status: SHIPPED | OUTPATIENT
Start: 2017-12-20 | End: 2018-12-20

## 2017-12-20 RX ORDER — OXYCODONE HYDROCHLORIDE 5 MG/1
5 TABLET ORAL EVERY 4 HOURS PRN
Qty: 12 TABLET | Refills: 0 | Status: SHIPPED | OUTPATIENT
Start: 2017-12-20 | End: 2017-12-29

## 2017-12-20 RX ORDER — CARISOPRODOL 350 MG/1
350 TABLET ORAL EVERY 8 HOURS PRN
Qty: 12 TABLET | Refills: 0 | Status: SHIPPED | OUTPATIENT
Start: 2017-12-20 | End: 2017-12-29

## 2017-12-20 RX ADMIN — CELECOXIB 200 MG: 200 CAPSULE ORAL at 14:34

## 2017-12-20 RX ADMIN — CEFAZOLIN SODIUM 2 G: 2 INJECTION, SOLUTION INTRAVENOUS at 01:25

## 2017-12-20 RX ADMIN — CELECOXIB 200 MG: 200 CAPSULE ORAL at 03:43

## 2017-12-20 RX ADMIN — CARISOPRODOL 350 MG: 350 TABLET ORAL at 09:14

## 2017-12-20 RX ADMIN — ACETAMINOPHEN 1000 MG: 500 TABLET ORAL at 09:08

## 2017-12-20 RX ADMIN — OXYCODONE HYDROCHLORIDE 5 MG: 5 TABLET ORAL at 06:49

## 2017-12-20 RX ADMIN — CEFAZOLIN SODIUM 2 G: 2 INJECTION, SOLUTION INTRAVENOUS at 10:52

## 2017-12-20 RX ADMIN — ACETAMINOPHEN 1000 MG: 500 TABLET ORAL at 14:33

## 2017-12-20 RX ADMIN — ACETAMINOPHEN 1000 MG: 500 TABLET ORAL at 03:42

## 2017-12-20 NOTE — PROGRESS NOTES
"The patient has no complaints and reports good pain control.   BP (!) 89/51 Comment: told nurse  Pulse 63  Temp 98.4 °F (36.9 °C)  Resp 18  Ht 157.5 cm (62\")  Wt 56.2 kg (124 lb)  SpO2 94%  BMI 22.68 kg/m2    Right breast expander in place. Incision intact with steristrips. Nipple areolar complex appears viable. There is some ecchymosis of the breast skin. Softness, no hematomas.    Plan for discharge home this morning. Doing well postoperatively.  "

## 2017-12-20 NOTE — PROGRESS NOTES
"Jannette Heike  1960  [id number]    Surgery Progress Note    Date of visit: 12/20/2017    Subjective: Pain well-controlled    Objective:    BP (!) 89/51 Comment: told nurse  Pulse 63  Temp 98.4 °F (36.9 °C)  Resp 18  Ht 157.5 cm (62\")  Wt 56.2 kg (124 lb)  SpO2 94%  BMI 22.68 kg/m2    Intake/Output Summary (Last 24 hours) at 12/20/17 0829  Last data filed at 12/20/17 0705   Gross per 24 hour   Intake             4780 ml   Output             3425 ml   Net             1355 ml       CV: Regular rate and rhythm  L: normal air entry  BREAST: Right mastectomy incision is healing well  Nipple and areola complex are viable  MELISA drainage is adequate        LABS:      Results from last 7 days  Lab Units 12/18/17  1204   WBC 10*3/mm3 6.51   HEMOGLOBIN g/dL 13.3   HEMATOCRIT % 41.1   PLATELETS 10*3/mm3 280       Results from last 7 days  Lab Units 12/18/17  1204   SODIUM mmol/L 140   POTASSIUM mmol/L 4.2   CHLORIDE mmol/L 103   CO2 mmol/L 31.0   BUN mg/dL 12   CREATININE mg/dL 0.80   CALCIUM mg/dL 9.2   BILIRUBIN mg/dL 0.4   ALK PHOS U/L 67   ALT (SGPT) U/L 13   AST (SGOT) U/L 14   GLUCOSE mg/dL 132*       Results from last 7 days  Lab Units 12/18/17  1204   SODIUM mmol/L 140   POTASSIUM mmol/L 4.2   CHLORIDE mmol/L 103   CO2 mmol/L 31.0   BUN mg/dL 12   CREATININE mg/dL 0.80   GLUCOSE mg/dL 132*   CALCIUM mg/dL 9.2     No results found for: LIPASE      Assessment/ Plan: Stable course status post right nipple sparing mastectomy and axillary dissection with immediate reconstruction  Instructions were given to patient  Plan to discharge home today after evaluation by Dr. Butler  Follow-up with me in 2 weeks    Problem List Items Addressed This Visit     Breast CA    Relevant Orders    Tissue Pathology Exam - Tissue, Breast, Right            Cristobal Luna MD  12/20/2017  8:29 AM    "

## 2017-12-20 NOTE — PLAN OF CARE
Problem: Patient Care Overview (Adult)  Goal: Plan of Care Review  Outcome: Ongoing (interventions implemented as appropriate)   12/19/17 2000 12/20/17 0421   Outcome Evaluation   Outcome Summary/Follow up Plan --  pt slept well this shift, vss, scheduled meds given   Coping/Psychosocial Response Interventions   Plan Of Care Reviewed With patient;family --      Goal: Adult Individualization and Mutuality  Outcome: Ongoing (interventions implemented as appropriate)    Goal: Discharge Needs Assessment  Outcome: Ongoing (interventions implemented as appropriate)      Problem: Activity Intolerance (Adult)  Goal: Identify Related Risk Factors and Signs and Symptoms  Outcome: Ongoing (interventions implemented as appropriate)    Goal: Activity Tolerance  Outcome: Ongoing (interventions implemented as appropriate)    Goal: Effective Energy Conservation Techniques  Outcome: Ongoing (interventions implemented as appropriate)      Problem: Infection, Risk/Actual (Adult)  Goal: Identify Related Risk Factors and Signs and Symptoms  Outcome: Ongoing (interventions implemented as appropriate)    Goal: Infection Prevention/Resolution  Outcome: Ongoing (interventions implemented as appropriate)      Problem: Pain, Acute (Adult)  Goal: Identify Related Risk Factors and Signs and Symptoms  Outcome: Ongoing (interventions implemented as appropriate)    Goal: Acceptable Pain Control/Comfort Level  Outcome: Ongoing (interventions implemented as appropriate)      Problem: Skin Integrity Impairment, Risk/Actual (Adult)  Goal: Identify Related Risk Factors and Signs and Symptoms  Outcome: Ongoing (interventions implemented as appropriate)    Goal: Skin Integrity/Wound Healing  Outcome: Ongoing (interventions implemented as appropriate)

## 2017-12-27 LAB
CYTO UR: NORMAL
LAB AP CASE REPORT: NORMAL
LAB AP CLINICAL INFORMATION: NORMAL
LAB AP SPECIAL STAINS: NORMAL
Lab: NORMAL
PATH REPORT.FINAL DX SPEC: NORMAL
PATH REPORT.GROSS SPEC: NORMAL

## 2018-01-02 ENCOUNTER — DOCUMENTATION (OUTPATIENT)
Dept: OTHER | Facility: HOSPITAL | Age: 58
End: 2018-01-02

## 2018-01-02 NOTE — PROGRESS NOTES
Patient called to ask is she could change her appointment to see Dr Hudson from 1/8 to this week - I checked and have her to see Dr Hudson on 1/4/18 @ 3:30. She also asked if she could tour the infusion area after her appointment with Dr. SORTO tomorrow - I told her yes.

## 2018-01-03 ENCOUNTER — OFFICE VISIT (OUTPATIENT)
Dept: RADIATION ONCOLOGY | Facility: HOSPITAL | Age: 58
End: 2018-01-03

## 2018-01-03 ENCOUNTER — HOSPITAL ENCOUNTER (OUTPATIENT)
Dept: RADIATION ONCOLOGY | Facility: HOSPITAL | Age: 58
Setting detail: RADIATION/ONCOLOGY SERIES
Discharge: HOME | End: 2018-01-03

## 2018-01-03 DIAGNOSIS — C50.511 MALIGNANT NEOPLASM OF LOWER-OUTER QUADRANT OF RIGHT BREAST OF FEMALE, ESTROGEN RECEPTOR POSITIVE (HCC): Primary | ICD-10-CM

## 2018-01-03 DIAGNOSIS — Z17.0 MALIGNANT NEOPLASM OF LOWER-OUTER QUADRANT OF RIGHT BREAST OF FEMALE, ESTROGEN RECEPTOR POSITIVE (HCC): Primary | ICD-10-CM

## 2018-01-04 ENCOUNTER — OFFICE VISIT (OUTPATIENT)
Dept: ONCOLOGY | Facility: CLINIC | Age: 58
End: 2018-01-04

## 2018-01-04 VITALS
DIASTOLIC BLOOD PRESSURE: 60 MMHG | BODY MASS INDEX: 23.55 KG/M2 | SYSTOLIC BLOOD PRESSURE: 111 MMHG | TEMPERATURE: 97.3 F | HEIGHT: 62 IN | HEART RATE: 80 BPM | RESPIRATION RATE: 14 BRPM | WEIGHT: 128 LBS

## 2018-01-04 DIAGNOSIS — Z17.0 MALIGNANT NEOPLASM OF LOWER-OUTER QUADRANT OF RIGHT BREAST OF FEMALE, ESTROGEN RECEPTOR POSITIVE (HCC): Primary | ICD-10-CM

## 2018-01-04 DIAGNOSIS — C50.511 MALIGNANT NEOPLASM OF LOWER-OUTER QUADRANT OF RIGHT BREAST OF FEMALE, ESTROGEN RECEPTOR POSITIVE (HCC): Primary | ICD-10-CM

## 2018-01-04 PROCEDURE — 99214 OFFICE O/P EST MOD 30 MIN: CPT | Performed by: INTERNAL MEDICINE

## 2018-01-04 RX ORDER — CEPHALEXIN 500 MG/1
CAPSULE ORAL
COMMUNITY
Start: 2017-12-28 | End: 2018-11-28 | Stop reason: HOSPADM

## 2018-01-04 RX ORDER — LIDOCAINE AND PRILOCAINE 25; 25 MG/G; MG/G
CREAM TOPICAL AS NEEDED
Qty: 30 G | Refills: 3 | Status: SHIPPED | OUTPATIENT
Start: 2018-01-04 | End: 2018-11-28 | Stop reason: HOSPADM

## 2018-01-04 RX ORDER — DEXAMETHASONE 4 MG/1
TABLET ORAL
Qty: 10 TABLET | Refills: 3 | Status: SHIPPED | OUTPATIENT
Start: 2018-01-04 | End: 2018-11-28 | Stop reason: HOSPADM

## 2018-01-04 RX ORDER — ONDANSETRON HYDROCHLORIDE 8 MG/1
8 TABLET, FILM COATED ORAL 3 TIMES DAILY PRN
Qty: 30 TABLET | Refills: 5 | Status: SHIPPED | OUTPATIENT
Start: 2018-01-04 | End: 2018-11-28 | Stop reason: HOSPADM

## 2018-01-04 NOTE — PROGRESS NOTES
CHIEF COMPLAINT: Management of stage IIB ER 10% 1-2+ positive VT negative HER-2/allison 0+ Bosch Gordon 6 out of 92.2 cm primary 2 out of 6 node positive pathologic stage breast cancer    Problem List:     Malignant neoplasm of lower-outer quadrant of breast of female, estrogen receptor positive    11/28/2017 Biopsy     Right 8:00 fibrocystic breast disease with atypical ductal hyperplasia         11/29/2017 Imaging     Bilateral breast MRI showed one area of abnormality were the atypical ductal hyperplasia was found but a second area of concern was also seen and MRI guided biopsy was suggested.  There was one prominent axillary lymph node and a contralateral subcutaneous abnormality favored to be an intramammary node or an adenoma but ultrasound was suggested         11/30/2017 Biopsy     MRI guided right axillary lymph node positive for 1 cm ductal carcinoma with apocrine features.  At the 8 o'clock position in the right breast there was a grade 3 infiltrating ductal carcinoma with apocrine features and focal high-grade ductal carcinoma in situ.  Estrogen receptor was 50% positive in both the invasive and intraductal component.  VT was negative.  HER-2/allison was negative by fish.         12/12/2017 Initial Diagnosis     Malignant neoplasm of lower-outer quadrant of breast of female, estrogen receptor positive       12/12/2017 -  Research Study Participant     She previously consented to the [facility] study         12/19/2017 Surgery     Surgery       Procedure: Right modified radical mastectomy        Pathology:  Infiltrating moderately differentiated ductal carcinoma Bosch Gordon 6 out of 9 with apocrine features, 2.2 cm primary with 5.5 cm of ductal carcinoma in situ.  Positive lymphovascular invasion.  One out of 3 sentinel lymph nodes and 1 out of 3 axillary lymph node dissection nodes positive for a total of 2 out of 6 lymph nodes.  ER was 10% 1-2+ positive.  VT was 0% positive.  HER-2/allison was 0+ on IHC.   Pathological T2 N1 stage IIB            HISTORY OF PRESENT ILLNESS:  The patient is a 57 y.o. female, here for follow up on management of  stage IIB ER 10% 1-2+ positive ND negative HER-2/allison 0+ Bosch Gordon 6 out of 92.2 cm primary 2 out of 6 node positive pathologic stage breast cancer.  She underwent mastectomy as above and I went over the pathology with her in detail today.  She is healing well.        Past Medical History:   Diagnosis Date   • Breast cancer      Past Surgical History:   Procedure Laterality Date   • BREAST RECONSTRUCTION, BREAST TISSUE EXPANDER INSERTION Right 12/19/2017    Procedure: IMMEDIATE STAGED RIGHT BREAST RECONSTRUCTION WITH TISSUE EXPANDER AND ALLODERM;  Surgeon: Dottie Butler MD;  Location: [facility] OR;  Service:    • D&C AND LAPAROSCOPY     • MASTECTOMY WITH SENTINEL NODE BIOPSY AND AXILLARY NODE DISSECTION Right 12/19/2017    Procedure: RIGHT NIPPLE SPARING BREAST MASTECTOMY WITH AXILLARY NODE DISSECTION;  Surgeon: Cristobal Luna MD;  Location: [facility] OR;  Service:        No Known Allergies    Family History and Social History reviewed and changed as necessary      REVIEW OF SYSTEM:   Review of Systems   Constitutional: Negative for appetite change, chills, diaphoresis, fatigue, fever and unexpected weight change.   HENT:   Negative for mouth sores, sore throat and trouble swallowing.    Eyes: Negative for icterus.   Respiratory: Negative for cough, hemoptysis and shortness of breath.    Cardiovascular: Negative for chest pain, leg swelling and palpitations.   Gastrointestinal: Negative for abdominal distention, abdominal pain, blood in stool, constipation, diarrhea, nausea and vomiting.   Endocrine: Negative for hot flashes.   Genitourinary: Negative for bladder incontinence, difficulty urinating, dysuria, frequency and hematuria.    Musculoskeletal: Negative for gait problem, neck pain and neck stiffness.   Skin: Negative for rash.   Neurological: Negative for  "dizziness, gait problem, headaches, light-headedness and numbness.   Hematological: Negative for adenopathy. Does not bruise/bleed easily.   Psychiatric/Behavioral: Negative for depression. The patient is not nervous/anxious.    All other systems reviewed and are negative.       PHYSICAL EXAM    Vitals:    01/04/18 1535   BP: 111/60   Pulse: 80   Resp: 14   Temp: 97.3 °F (36.3 °C)   Weight: 58.1 kg (128 lb)   Height: 157.5 cm (62\")     Constitutional: Appears well-developed and well-nourished. No distress.   ECOG: (0) Fully active, able to carry on all predisease performance without restriction  HENT:   Head: Normocephalic.   Mouth/Throat: Oropharynx is clear and moist.   Eyes: Conjunctivae are normal. Pupils are equal, round, and reactive to light. No scleral icterus.   Neck: Neck supple. No JVD present. No thyromegaly present.   Cardiovascular: Normal rate, regular rhythm and normal heart sounds.    Pulmonary/Chest: Breath sounds normal. No respiratory distress.   Abdominal: Soft. Exhibits no distension and no mass. There is no hepatosplenomegaly. There is no tenderness. There is no rebound and no guarding.   Musculoskeletal:Exhibits no edema, tenderness or deformity.   Neurological: Alert and oriented to person, place, and time. Exhibits normal muscle tone.   Skin: No ecchymosis, no petechiae and no rash noted. Not diaphoretic. No cyanosis. Nails show no clubbing.   Psychiatric: Normal mood and affect.   Vitals reviewed.      Admission on 12/19/2017, Discharged on 12/20/2017   Component Date Value Ref Range Status   • HCG, Urine, QL 12/19/2017 Negative  Negative Final    06/19   • Lot Number 12/19/2017 [id number]   Final   • Internal Positive Control 12/19/2017 Reactive   Final   • Internal Negative Control 12/19/2017 Test not performed   Final   • Case Report 12/19/2017    Final                    Value:Surgical Pathology Report                         Case: [id number]                                  Authorizing " Provider:  Cristobal Luna MD    Collected:           12/19/2017 09:00 AM          Ordering Location:     [facility]   Received:            12/19/2017 09:12 AM                                 OR                                                                           Pathologist:           Juan Carlos Tapia MD                                                            Specimens:   1) - Breast, Right, long suture lateral, short suture nipple 664g                                   2) - Lymph Node, long suture lateral; short suture at nipple; contains right axillary               content as well                                                                           • Clinical Information 12/19/2017    Final                    Value:This result contains rich text formatting which cannot be displayed here.   • Final Diagnosis 12/19/2017    Final                    Value:This result contains rich text formatting which cannot be displayed here.   • Gross Description 12/19/2017    Final                    Value:This result contains rich text formatting which cannot be displayed here.   • Special Stains 12/19/2017    Final                    Value:This result contains rich text formatting which cannot be displayed here.   • Microscopic Description 12/19/2017    Final                    Value:This result contains rich text formatting which cannot be displayed here.   Appointment on 12/18/2017   Component Date Value Ref Range Status   • WBC 12/18/2017 6.51  3.50 - 10.80 10*3/mm3 Final   • RBC 12/18/2017 4.61  3.89 - 5.14 10*6/mm3 Final   • Hemoglobin 12/18/2017 13.3  11.5 - 15.5 g/dL Final   • Hematocrit 12/18/2017 41.1  34.5 - 44.0 % Final   • MCV 12/18/2017 89.2  80.0 - 99.0 fL Final   • MCH 12/18/2017 28.9  27.0 - 31.0 pg Final   • MCHC 12/18/2017 32.4  32.0 - 36.0 g/dL Final   • RDW 12/18/2017 12.9  11.3 - 14.5 % Final   • RDW-SD 12/18/2017 41.5  37.0 - 54.0 fl Final   • MPV 12/18/2017 9.6  6.0 - 12.0  fL Final   • Platelets 12/18/2017 280  150 - 450 10*3/mm3 Final   • Glucose 12/18/2017 132* 70 - 100 mg/dL Final   • BUN 12/18/2017 12  9 - 23 mg/dL Final   • Creatinine 12/18/2017 0.80  0.60 - 1.30 mg/dL Final   • Sodium 12/18/2017 140  132 - 146 mmol/L Final   • Potassium 12/18/2017 4.2  3.5 - 5.5 mmol/L Final   • Chloride 12/18/2017 103  99 - 109 mmol/L Final   • CO2 12/18/2017 31.0  20.0 - 31.0 mmol/L Final   • Calcium 12/18/2017 9.2  8.7 - 10.4 mg/dL Final   • Total Protein 12/18/2017 7.0  5.7 - 8.2 g/dL Final   • Albumin 12/18/2017 4.30  3.20 - 4.80 g/dL Final   • ALT (SGPT) 12/18/2017 13  7 - 40 U/L Final   • AST (SGOT) 12/18/2017 14  0 - 33 U/L Final   • Alkaline Phosphatase 12/18/2017 67  25 - 100 U/L Final   • Total Bilirubin 12/18/2017 0.4  0.3 - 1.2 mg/dL Final   • eGFR Non  Amer 12/18/2017 74  >60 mL/min/1.73 Final   • Globulin 12/18/2017 2.7  gm/dL Final   • A/G Ratio 12/18/2017 1.6  1.5 - 2.5 g/dL Final   • BUN/Creatinine Ratio 12/18/2017 15.0  7.0 - 25.0 Final   • Anion Gap 12/18/2017 6.0  3.0 - 11.0 mmol/L Final       Assessment/Plan     1.  Pathologic stage IIB T2 N1 M0 2.2 cm primary, 2 out of 6 nodes positive, Bosch Gordon 6 out of 9, ER 10% 1-2+ positive, DE negative, HER-2/allison 0+ status post mastectomy    Discussion: she informs me that she has already had an echocardiogram that was normal at [facility] with another oncologist.  I will check Oncotype as were she a risk score over 25 I would place her on clinical trial looking at Arimidex plus or minus everolimus in 1-3 positive node patients.  The additive risk of hyperlipidemia, diarrhea, rashes, liver enzyme dysfunction, etc. from the Evirolimus was discussed.  My standard in this setting would be to treat her with dose dense Adriamycin and Cytoxan ×4 with Neulasta followed by 12 weekly doses of Taxol.  She will need a port with Dr. SORTO which we will arrange.  She wants an ice And I asked her to discuss this with our nurse navigator to  see if she can help procure that.  Following the chemotherapy I recommended radiation in light of her node positivity even though she's had a mastectomy and at least 5 years and more likely 10 years of Arimidex especially she is tolerating the Arimidex.  We can also look into putting her on theAIMMS trial that looks at doing blood testing on patient's for looking for molecular markers that would predict for musculoskeletal symptoms on aromatase inhibitors.  We could also look at our aspirin trial.  She is  and the arm of theAIMMS trial for 's is still open.  Following dose dense ACT I would then treat her with radiation and Arimidex plus or minus the research options.      Radhames Hudson MD    01/04/2018

## 2018-01-04 NOTE — PROGRESS NOTES
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  It was watched every 6 months and then changed in size and caused serous discharge daily for one year.  The initial biopsy showed atypical ductal hyperplasia.  An MRI showed the suspicious area and a second area of concern.  MRI guided biopsy of an axillary node was positive for ductal carcinoma with apocrine features.  In the 8 o'clock position of the right breast there was a grade 3 infiltrating ductal carcinoma.  She discussed neoadjuvant chemotherapy with Dr. Hduson and ultimately decided to undergo right nipple sparing mastectomy and axillary node dissection on 2017 by .  Final pathology revealed infiltrating ductal carcinoma, tumor size 2.2 x 1.5 cm, Bosch Gordon score 6/9 and ductal carcinoma in situ with apocrine features, tumor size 5.5 x 3.5 x 3 cm.  All margins were negative for tumor by at least 9 mm.  There was lymphovascular invasion identified.  One of 3 axillary nodes were positive for metastatic carcinoma.  The tumor was ER positive, CO negative and HER-2 negative.  Additional nodes were taken for total of 2/6 positive lymph nodes.  There was no extranodal extension of tumor.  She is here to see Dr. SORTO today and I been asked to see her regarding postoperative radiotherapy.  She is planning to have reconstruction  and will undergo chemotherapy per Dr. Hudson..    No Known Allergies    Social History   Substance Use Topics   • Smoking status: Never Smoker   • Smokeless tobacco: Never Used   • Alcohol use No       Past Medical History:   Diagnosis Date   • Breast cancer        family history includes Breast cancer in her mother; Diabetes in her father and mother; Hypertension in her mother.     Past Surgical History:   Procedure Laterality Date   • BREAST RECONSTRUCTION, BREAST TISSUE EXPANDER INSERTION Right 12/19/2017    Procedure: IMMEDIATE STAGED RIGHT BREAST RECONSTRUCTION WITH TISSUE EXPANDER AND ALLODERM;  Surgeon: Dottie Butler MD;  Location: [facility] OR;  Service:    • D&C AND LAPAROSCOPY     • MASTECTOMY WITH SENTINEL NODE BIOPSY AND AXILLARY NODE DISSECTION Right 12/19/2017    Procedure: RIGHT NIPPLE SPARING BREAST MASTECTOMY WITH AXILLARY NODE DISSECTION;  Surgeon: Cristobal Luna MD;  Location: [facility] OR;  Service:         Review of Systems - Oncology        Objective   VITAL SIGNS: There were no vitals filed for this visit.     KPS      90%    Physical Exam   Constitutional: She appears well-developed and well-nourished.   HENT:   Head: Normocephalic and atraumatic.   Cardiovascular: Normal rate, regular rhythm and normal heart sounds.    Pulmonary/Chest: Effort normal and breath sounds normal.   Abdominal: Soft.   Nursing note and vitals reviewed.   Examination the breasts reveal (masses suspicious lesions she has no axillary adenopathy.  The right breast is surgically absent.  The incision site is healing well the nipple is dark.  She has a drain in place.         The following portions of the patient's history were reviewed and updated as appropriate: allergies, current medications, past family history, past medical history, past social history, past surgical history and problem list.    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  She had 2 of 6 positive nodes and tumor  that was 2.2 cm of invasive cancer and 5.5 cm of DCIS.    RECOMMENDATIONS:  We discussed the role of radiotherapy after mastectomy.  I explained to Dr. Burroughs and her  that for patients with 4 or more positive nodes or more than 5 cm of invasive tumor radiotherapy is recommended.  For patients with 1-3 nodes radiotherapy should be strongly considered according to NCCN guidelines. The recent guidelines from the ASCO, ASRO and SSO showed the panel unanimous his family agreed that the available evident showed that PMR T reduces the risk of local regional failure, any recurrence, and breast cancer mortality for patients with T1/T2 breast cancer in 1-3 positive nodes.  However you need to look at subsets of these patients that could be of low risk.  Much clinical judgment is involved in the decision making.  I want to check with pathology regarding the amount of cancer infiltrated into the 2 lymph nodes.  I gave Dr. Burroughs 1 article to review and there are multiple articles on this subject for review.  This was our initial visit and we can further discuss this as she progresses through treatment.  We also to take into account reconstruction.  She understands that with radiotherapy there will likely  be a poorer cosmetic result and possible capsulitis.  Thank you very much for asking me to see Dr. Burroughs.         Elza Vizcaino MD      Errors in dictation may reflect use of voice recognition software and not all errors in transcription may have been detected prior to signing.

## 2018-01-08 ENCOUNTER — DOCUMENTATION (OUTPATIENT)
Dept: OTHER | Facility: HOSPITAL | Age: 58
End: 2018-01-08

## 2018-01-08 NOTE — PROGRESS NOTES
Patient called - we reviewed her appointment 1/18/18 and chemo infusion 1/22/18. She had questions about cold cap and asked if she could use one of the rooms vs [facility] for the cold cap  - I explained that the rooms could not be used for that purpose -she verbalized understanding. Pharmacist to call patient this afternoon with there questions about what medications she would be taking. Port placement 1/12/18 with Dr. SORTO

## 2018-01-09 ENCOUNTER — DOCUMENTATION (OUTPATIENT)
Dept: OTHER | Facility: HOSPITAL | Age: 58
End: 2018-01-09

## 2018-01-09 NOTE — PROGRESS NOTES
Patient called to ask: how large and specifically how much of the lymph nodes were involved with cancer - she states that she was given a copy of the surgical path report - I have notified Dr SORTO of her question

## 2018-01-10 ENCOUNTER — EDUCATION (OUTPATIENT)
Dept: ONCOLOGY | Facility: HOSPITAL | Age: 58
End: 2018-01-10

## 2018-01-10 NOTE — PLAN OF CARE
Patient preferred email for education.  Below is the email I sent to the patient.  A follow-up email included the infusion lengths of each agents.     Good afternoon Ms. Burroughs,  Our pharmacy team was notified of your desire to receive education materials on the chemotherapeutic agents and pre-medications. The chemotherapy regimen of interest is shown below:  Doxorubicin + Cyclophosphamide every 14 days x 4 cycles, followed by Paclitaxel weekly x 12 cycles  Following this regimen, Dr. Hudson has recommended initiating Arimidex 1mg daily. This is an oral medication where you would not have to come in to the infusion center.  Below are information sheets for all of the agents noted above.  Doxorubicin: http://www.[facility].com/chemotherapy/drug-info/doxorubicin.aspx  Cyclophosphamide: http://www.[facility].com/chemotherapy/drug-info/cyclophosphamide.aspx  Paclitaxel: http://www.[facility].com/chemotherapy/drug-info/Paclitaxel.aspx  Arimidex: http://www.[facility].com/chemotherapy/drug-info/arimidex.aspx  Also, there is a Neulasta patch that will be applied on day one of each of the first 4 cycles to help stimulate growth of your white blood cells (WBCs). This is not considered chemotherapy but here is the information sheet for Neulasta: http://www.[facility].com/chemotherapy/drug-info/Neulasta.aspx  In addition to the chemotherapeutic agents, there are a few pre-medications that you will be given to help alleviate nausea that could accompany administration of the chemotherapeutic agents. These will be administered before the chemotherapy on the same day of treatment. Each are administered intravenously. These medications include:               Palonosetron (Aloxi)              Fosaprepitant (Emend)              Dexamethasone (Decadron)  You also will receive prescriptions for oral anti-nausea medications. This will include ondansetron (Zofran) and dexamethasone (Decadron). An individualized patient calendar will give on your  first day of infusion. Please do not hesitate to contact the clinic if you have any questions.   Sincerely,  Helen Messina

## 2018-01-11 ENCOUNTER — TRANSCRIBE ORDERS (OUTPATIENT)
Dept: ADMINISTRATIVE | Facility: HOSPITAL | Age: 58
End: 2018-01-11

## 2018-01-11 ENCOUNTER — DOCUMENTATION (OUTPATIENT)
Dept: OTHER | Facility: HOSPITAL | Age: 58
End: 2018-01-11

## 2018-01-11 DIAGNOSIS — C50.511 MALIGNANT NEOPLASM OF LOWER-OUTER QUADRANT OF RIGHT FEMALE BREAST, UNSPECIFIED ESTROGEN RECEPTOR STATUS (HCC): Primary | ICD-10-CM

## 2018-01-11 NOTE — PROGRESS NOTES
Patient notified me that she wanted to cancel her appointment with [facility] on 1/18 and 1/22 because she said that her schedule and Dr Hudson schedule did not coincide and that she wanted to use cold cap and that there  Was not enough room in our infusion area for that. She was doing elsewhere- also she would be able to have more than 1 person with her during infusion at the other place. I called and cancelled her 1/18 and 1/24 appointment. I notified Dr Hudson first. She is also asking question about lymph node involvement and the extent of the involvement - I suggested that she speak with the pathologist. I called Dr Muro and he said it was fine for her to call - I gave her the number to call.

## 2018-01-12 ENCOUNTER — APPOINTMENT (OUTPATIENT)
Dept: GENERAL RADIOLOGY | Facility: HOSPITAL | Age: 58
End: 2018-01-12
Attending: SURGERY

## 2018-01-12 ENCOUNTER — HOSPITAL ENCOUNTER (OUTPATIENT)
Dept: GENERAL RADIOLOGY | Facility: HOSPITAL | Age: 58
Discharge: HOME | End: 2018-01-12
Attending: SURGERY

## 2018-01-12 DIAGNOSIS — C50.511 MALIGNANT NEOPLASM OF LOWER-OUTER QUADRANT OF RIGHT FEMALE BREAST, UNSPECIFIED ESTROGEN RECEPTOR STATUS (HCC): ICD-10-CM

## 2018-01-12 PROCEDURE — 71045 X-RAY EXAM CHEST 1 VIEW: CPT

## 2018-07-02 ENCOUNTER — DOCUMENTATION (OUTPATIENT)
Dept: OTHER | Facility: HOSPITAL | Age: 58
End: 2018-07-02

## 2018-07-02 NOTE — PROGRESS NOTES
I called patient for Cancer Registry. While talking to the patient she said that she has seen Dr Butler and that Dr Butler we referring her for massage to [facility] but that the patient has not heard from PT about an appointment date/time.  I called PT department and spoke to Kenny who said that they had received referral and that they would call patient with date and time for appointment - Patient also inquired about full body massage- I sent her in email the flyer for massage through [facility].

## 2018-07-03 ENCOUNTER — DOCUMENTATION (OUTPATIENT)
Dept: OTHER | Facility: HOSPITAL | Age: 58
End: 2018-07-03

## 2018-07-03 NOTE — PROGRESS NOTES
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 I replied with this email to patient:  I called and left a message for Dr. Ale Wise’s nurse to fax a referral to [facility] outpatient physical therapy.  The therapist that do rehab will most likely be a different therapist that will do the massage to your chest area.  If you have not heard from the P.T. department by Thursday afternoon, please let me know.

## 2018-07-12 ENCOUNTER — HOSPITAL ENCOUNTER (OUTPATIENT)
Dept: PHYSICAL THERAPY | Facility: HOSPITAL | Age: 58
Setting detail: THERAPIES SERIES
Discharge: HOME | End: 2018-07-12

## 2018-07-12 DIAGNOSIS — Z74.09 IMPAIRED FUNCTIONAL MOBILITY, BALANCE, AND ENDURANCE: Primary | ICD-10-CM

## 2018-07-12 PROCEDURE — 97110 THERAPEUTIC EXERCISES: CPT | Performed by: PHYSICAL THERAPIST

## 2018-07-12 PROCEDURE — 97162 PT EVAL MOD COMPLEX 30 MIN: CPT | Performed by: PHYSICAL THERAPIST

## 2018-07-12 NOTE — THERAPY EVALUATION
Outpatient Physical Therapy Ortho Initial Evaluation   [place]     Patient Name: Jannette Burroughs  : 1960  MRN: [id number]  Today's Date: 2018      Visit Date: 2018    Patient Active Problem List   Diagnosis   • Malignant neoplasm of lower-outer quadrant of breast of female, estrogen receptor positive (CMS/HCC)        Past Medical History:   Diagnosis Date   • Breast cancer (CMS/HCC)         Past Surgical History:   Procedure Laterality Date   • BREAST RECONSTRUCTION, BREAST TISSUE EXPANDER INSERTION Right 2017    Procedure: IMMEDIATE STAGED RIGHT BREAST RECONSTRUCTION WITH TISSUE EXPANDER AND ALLODERM;  Surgeon: Dottie Butler MD;  Location:  [facility];  Service:    • D&C AND LAPAROSCOPY     • MASTECTOMY WITH SENTINEL NODE BIOPSY AND AXILLARY NODE DISSECTION Right 2017    Procedure: RIGHT NIPPLE SPARING BREAST MASTECTOMY WITH AXILLARY NODE DISSECTION;  Surgeon: Cristobal Luna MD;  Location: [facility];  Service:        Visit Dx:     ICD-10-CM ICD-9-CM   1. Impaired functional mobility, balance, and endurance Z74.09 V49.89             Patient History     Row Name 18 1500          Chief Complaint Difficulty with daily activities;Fatigue/poor endurance;Impaired sensation;Tinglings;Pain;Numbness;Tightness  -MW    Type of Pain Chest pain  -MW    Date Current Problem(s) Began 06/15/18   Mid  at that end of Chemo   -MW    Brief Description of Current Complaint Pt reports neuropathy of all fingers with pins and needles sensation from tip of fingers to PIP joint. She also reports same sensation in her feet to about the mid foot level. This has been the same since the last chemo treatment in . She also reports fatigue and poor endurance. 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 She presents to PT for assistance in regaining strength, endurance and any help available for neuropathy.   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 Has been doing some practice management but no pt care. Used to walk 3-4 times per week, now only about 2 x wk; about 2 miles on a \"good day\"   -MW    Patient seeing anyone else for problem(s)? Will follow up with Carolina Silva for ASTYM / MLD; Neurologist appointment 7/26 with Dr Joshua Jacome   -MW    How has patient tried to help current problem? Walking when able  -MW    Are you or can you be pregnant No  -MW          Pain Location Chest;Breast   Right   -MW    Pain at Present 1   \"tightness not really pain\"   -MW    Pain Description Tightness;Heaviness  -MW    Tolerance Time- Standing 15 minutes   -MW    Tolerance Time- Walking 2 miles on \"good day\"  -MW    Is your sleep disturbed? Yes  -MW    Total hours of sleep per night 6-7  -MW    Difficulties at work? 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 Listening;Reading;Demonstration  -MW    Teaching needs identified Home Exercise Program;Management of Condition  -MW    Patient is concerned about/has problems with Grasping objects lifting;Coordination;Performing home management (household chores, shopping, care of dependents);Performing job responsibilities/community activities (work, school,;Reaching over head;Repetitive movements of the hand, arm, shoulder;Standing;Walking;Writing/grasping items with hand(s)  -MW    Does patient have problems with the following? Other (comment)   not reported; possible depression   -MW    Barriers " to learning None  -MW    Recommended Referrals --   consider OT consult for coordination   -MW    Pt Participated in POC and Goals Yes  -MW          Are you being hurt, hit, or frightened by anyone at home or in your life? No  -MW    Are you being neglected by a caregiver No  -MW      User Key  (r) = Recorded By, (t) = Taken By, (c) = Cosigned By    Initials Name Provider Type    BENJAMIN Chapin PT Physical Therapist                PT Ortho     Row Name 07/12/18 1500       Subjective Comments    Subjective Comments Primary concern is to improve overall strength and endurance; be able to go back to work.   -MW       Subjective Pain    Able to rate subjective pain? yes  -MW    Pre-Treatment Pain Level 1  -MW    Post-Treatment Pain Level 1  -MW    Subjective Pain Comment tightness Rt breast; pins and needles in fingers and feet   -MW       General ROM    RT Upper Ext Rt Shoulder ABduction;Rt Shoulder Flexion;Rt Shoulder Internal Rotation;Comments  -MW    GENERAL ROM COMMENTS LUE AROM WFL; BLE WFL   -MW       Right Upper Ext    Rt Shoulder Abduction AROM 0-165  -MW    Rt Shoulder Flexion AROM 0-165  -MW    Rt Shoulder Internal Rotation AROM hand to mid back; below Lt hand   -MW    Rt Upper Extremity Comments  elbow, wrist and hand grossly WFL   -MW       MMT (Manual Muscle Testing)    Additional Documentation General Assessment (Manual Muscle Testing) (Group)  -MW       General Assessment (Manual Muscle Testing)    General Manual Muscle Testing (MMT) Assessment upper extremity strength deficits identified;lower extremity strength deficits identified  -MW       Upper Extremity (Manual Muscle Testing)    Comment, MMT: Upper Extremity Bilat: shoulders flex, ABD 4/5; biceps 4/5; Rt triceps 3+/5 due to pulling in breast; LT triceps 4-/5; wrists flex/ ext 4/5; pronation/ supination 3+/5   -MW       Lower Extremity (Manual Muscle Testing)    Comment, MMT: Lower Extremity Bilat: hip flex 4+/5, ABD/ ADD 4/5; knee flex 4-/5;  ankle DF/PF, INV/EVER 5/5.   -MW        Strength Right    # Reps 3  -MW    Right Rung 2  -MW    Right  Test 1 28  -MW    Right  Test 2 28  -MW    Right  Test 3 30  -MW     Strength Average Right 28.67  -MW        Strength Left    # Reps 3  -MW    Left Rung 2  -MW    Left  Test 1 35  -MW    Left  Test 2 28  -MW    Left  Test 3 25  -MW     Strength Average Left 29.33  -MW       Sensation    Light Touch Partial deficits in the RUE;Partial deficits in the LUE;Partial deficits in the RLE;Partial deficits in the LLE   decreased distally   -MW       Flexibility    Flexibility Tested? Lower Extremity  -MW       Lower Extremity Flexibility    Hamstrings Bilateral:;Moderately limited   90/90 position limited by approx 30 deg ext lag   -MW       Hand  Strength     Strength Affected Side Bilateral  -MW      User Key  (r) = Recorded By, (t) = Taken By, (c) = Cosigned By    Initials Name Provider Type    BENJAMIN Chapin, PT Physical Therapist                                  PT OP Goals     Row Name 07/12/18 1500          PT Short Term Goals    STG Date to Achieve 08/09/18  -MW     STG 1 Pt independent with HEP for general strengthening and flexibility to promote return to PLOF.   -MW     STG 2 Pt re-evaluated for ASTYM Rt breast tightness.   -MW        Long Term Goals    LTG 1 Pt to report improved exercise tolerance to resume walking 3-4 times per week.   -MW     LTG 2 Pt to report improved energy for household and work tasks to improve QOL.   -MW     LTG 3 Pt independent with advanced HEP for strength, flexibility and endurance.   -MW     LTG 4 Pt to report improvement in piano playing.   -MW        Time Calculation    PT Goal Re-Cert Due Date 10/04/18  -MW       User Key  (r) = Recorded By, (t) = Taken By, (c) = Cosigned By    Initials Name Provider Type    BENJAMIN Chapin PT Physical Therapist                PT Assessment/Plan     Row Name 07/12/18 1500          PT  Assessment    Functional Limitations Performance in work activities;Performance in self-care ADL;Performance in leisure activities;Limitation in home management  -MW     Impairments Muscle strength;Range of motion;Sensation;Endurance;Coordination  -MW     Assessment Comments Pt presents with evolving symptoms including generalized weakness, fatigue and sensory changes in fingers and feet which limit 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 These conditions are likely side effects from the treatment of her right breast CA with mastectomy and chemotherapy. She is expected to improve in strength and endurance with HEP and time. 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 She may benefit from an OT consult for more assistance with hand dexterity and coordination as she would like to return to pt care as a dentist. She also is expected to benefit from manual therapy to address the soft tissue restrictions around her right breast reconstruction.   -MW     Please refer to paper survey for additional self-reported information Yes  -MW     Rehab Potential Good  -MW     Patient/caregiver participated in establishment of treatment plan and goals Yes  -MW     Patient would benefit from skilled therapy intervention Yes  -MW        PT Plan    PT Frequency 1x/week;Other (comment)   1-2 x month for HEP; weekly for ASTYM   -MW     Predicted Duration of Therapy Intervention (Therapy Eval) 12 weeks   -MW     Planned CPT's? PT EVAL MOD COMPLELITY: 97034;PT THER PROC EA 15 MIN: 25187;PT MANUAL THERAPY EA 15 MIN: 51767  -MW     Physical Therapy Interventions (Optional Details) home exercise program;patient/family education;ROM (Range of Motion);strengthening;stretching  -MW     PT Plan Comments Follow up for ASTYM with Carolina Silva 7/17; HEP follow up 8/2.   -MW       User Key  (r) = Recorded By, (t) = Taken By, (c) = Cosigned By    Initials Name Provider Type    MW Dee Chapin, PT Physical  Therapist                  Exercises     Row Name 07/12/18 1500             Subjective Comments    Subjective Comments Primary concern is to improve overall strength and endurance; be able to go back to work.   -MW         Subjective Pain    Able to rate subjective pain? yes  -MW      Pre-Treatment Pain Level 1  -MW      Post-Treatment Pain Level 1  -MW      Subjective Pain Comment tightness Rt breast; pins and needles in fingers and feet   -MW         Total Minutes    13002 - PT Therapeutic Exercise Minutes 23  -MW         Exercise 1    Exercise Name 1 Hamstring stretch with neural tension   -MW      Cueing 1 Verbal;Tactile  -MW      Reps 1 3 each leg   -MW         Exercise 2    Exercise Name 2 bridges   -MW      Cueing 2 Verbal  -MW      Reps 2 5  -MW         Exercise 3    Exercise Name 3 wall squats   -MW      Cueing 3 Demo;Verbal  -MW      Reps 3 5  -MW         Exercise 4    Exercise Name 4 Heel raises with UE support   -MW      Cueing 4 Demo;Verbal  -MW      Reps 4 5  -MW         Exercise 5    Exercise Name 5 calf stretch at wall   -MW      Cueing 5 Demo;Verbal  -MW      Reps 5 2   -MW         Exercise 6    Exercise Name 6 Shoulder rolls backwards   -MW      Cueing 6 Demo;Verbal  -MW      Reps 6 5  -MW         Exercise 7    Exercise Name 7 Reach and pull / scapular protraction and retraction   -MW      Cueing 7 Demo;Verbal  -MW      Reps 7 5  -MW         Exercise 8    Exercise Name 8 Median nerve / fascial stretch   -MW      Cueing 8 Demo;Verbal  -MW      Reps 8 3  -MW      Additional Comments 30 second hold   -MW        User Key  (r) = Recorded By, (t) = Taken By, (c) = Cosigned By    Initials Name Provider Type    BENJAMIN Chapin PT Physical Therapist                        Outcome Measure Options: Disabilities of the Arm, Shoulder, and Hand (DASH)  DASH  Open a tight or new jar.: Moderate Difficulty  Write: Mild Difficulty  Turn a key: Mild Difficulty  Prepare a meal: Mild Difficulty  Push open a heavy  door: Moderate Difficulty  Place an object on a shelf above your head: Moderate Difficulty  Do heavy household chores (e.g., wash walls, wash floors): Moderate Difficulty  Garden or do yard work: Moderate Difficulty  Make a bed: Mild Difficulty  Carry a shopping bag or briefcase: Mild Difficulty  Carry a heavy object (over 10 lbs): Moderate Difficulty  Change a lightbulb overhead: Moderate Difficulty  Wash or blow dry your hair: No Difficulty  Wash your back: Mild Difficulty  Put on a pullover sweater: Mild Difficulty  Use a knife to cut food: No Difficulty  Recreational activities in which require little effort (e.g., cardplaying, knitting, etc.): No Difficulty  Recreational activities in which you take some force or impact through your arm, should or hand (e.g. golf, hammering, tennis, etc.): Moderate Difficulty  Recreational Activities in which you move your arm freely (e.g., frisbee, badminton, etc.): Mild Difficulty  Manage transportation needs (getting from one place to another): No Difficulty  Sexual Activities: Mild Difficulty  During the past week, to what extent has your arm, shoulder, or hand problem interfered with your normal social activites with family, friends, neighbors or groups?: Slightly  During the past week, were you limited in your work or other regular daily activities as a result of your arm, shoulder or hand problem?: Very limited  Arm, Shoulder, or hand pain: Mild  Arm, shoulder or hand pain when you performed any specific activity: Mild  Tingling (pins and needles) in your arm, shoulder, or hand: Severe  Weakness in your arm, shoulder or hand: Moderate  Stiffness in your arm, shoulder or hand: Mild  During the past week, how much difficulty have you had sleeping because of the pain in your arm, shoulder or hand?: Mild Difficulty  I feel less capable, less confident or less useful because of my arm, shoulder or hand problem: Agree  DASH Sum : 71  Number of Questions Answered: 30  DASH  Score: 34.17  Work Module (Optional)  Using your usual technique for your work?: Unable  Doing your usual work because of arm, shoulder or hand pain?: Unable  Doing your work as well as you would like?: Unable  Spending your usual amount of time doing your work?: Unable  Work Module Score: 100         Time Calculation:     Therapy Suggested Charges     Code   Minutes Charges    27248 (CPT®) Hc Pt Neuromusc Re Education Ea 15 Min      66205 (CPT®) Hc Pt Ther Proc Ea 15 Min 23 2    10453 (CPT®) Hc Gait Training Ea 15 Min      57937 (CPT®) Hc Pt Therapeutic Act Ea 15 Min      74960 (CPT®) Hc Pt Manual Therapy Ea 15 Min      62332 (CPT®) Hc Pt Ther Massage- Per 15 Min      38462 (CPT®) Hc Pt Iontophoresis Ea 15 Min      99051 (CPT®) Hc Pt Elec Stim Ea-Per 15 Min      13018 (CPT®) Hc Pt Ultrasound Ea 15 Min      89081 (CPT®) Hc Pt Self Care/Mgmt/Train Ea 15 Min      Total  23 2          Start Time: 1500  Total Timed Code Minutes- PT: 23 minute(s)     Therapy Charges for Today     Code Description Service Date Service Provider Modifiers Qty    21265326786 HC PT THER PROC EA 15 MIN 7/12/2018 Dee Chapin, PT GP 2    38934575103 HC PT EVAL MOD COMPLEXITY 4 7/12/2018 Dee Chapin, PT GP 1          PT G-Codes  Outcome Measure Options: Disabilities of the Arm, Shoulder, and Hand (DASH)         Dee Chapin, PT  7/12/2018

## 2018-07-17 ENCOUNTER — HOSPITAL ENCOUNTER (OUTPATIENT)
Dept: PHYSICAL THERAPY | Facility: HOSPITAL | Age: 58
Setting detail: THERAPIES SERIES
Discharge: HOME | End: 2018-07-17

## 2018-07-17 DIAGNOSIS — Z74.09 IMPAIRED FUNCTIONAL MOBILITY, BALANCE, AND ENDURANCE: ICD-10-CM

## 2018-07-17 DIAGNOSIS — L90.5 SCAR CONDITION AND FIBROSIS OF SKIN: Primary | ICD-10-CM

## 2018-07-17 PROCEDURE — 97140 MANUAL THERAPY 1/> REGIONS: CPT

## 2018-07-17 NOTE — THERAPY TREATMENT NOTE
Outpatient Physical Therapy Lymphedema Treatment Note  [facility]     Patient Name: Jannette Burroughs  : 1960  MRN: [id number]  Today's Date: 2018        Visit Date: 2018    Visit Dx:    ICD-10-CM ICD-9-CM   1. Scar condition and fibrosis of skin L90.5 709.2   2. Impaired functional mobility, balance, and endurance Z74.09 V49.89       Patient Active Problem List   Diagnosis   • Malignant neoplasm of lower-outer quadrant of breast of female, estrogen receptor positive (CMS/HCC)              Lymphedema     Row Name 18 1700             Subjective Pain    Able to rate subjective pain? yes  -CD      Pre-Treatment Pain Level 7  -CD      Post-Treatment Pain Level 7  -CD      Subjective Pain Comment right breast tightness and pain especially along the lateral chest border  -CD         Subjective Comments    Subjective Comments Patient reported that she is tight and that it is referring pain. She has not been able to touch the right breast since her surgery as it is uncomfortable so she has not touched it  -CD         Skin Changes/Observations    Skin Observations Comment very tight skin across the right expander that is really tight. There is hardly any movement of the skin over the expander. There is puckering up of tissue laterally along the right side of her chest and the scar under her right breast is so tight the breast sits on the scar and i could not lift the expander off the chest it was so tight  -CD         Manual Lymphatic Drainage    Manual Lymphatic Drainage astym  -CD      Astym scar(s) / incision line(s);anterior chest;anterior-lateral chest  -CD      Scar(s) / Incision Line(s) right  -CD      Anterior Chest right  -CD      Anterior-Lateral Chest right  -CD      Anterior-Lateral Chest Comment soft tissue massage to the whole area in supine and in left side lying after  -CD      Manual Lymphatic Drainage Comments opened up axilla region  -CD         Compression/Skin Care     Compression/Skin Care skin care  -CD      Skin Care moisturizing lotion applied  -CD      Compression/Skin Care Comments very tight skin to move with multiple areas of soft tissue dysfunction with the Astym tools  -CD        User Key  (r) = Recorded By, (t) = Taken By, (c) = Cosigned By    Initials Name Provider Type    NABOR Silva, PT Physical Therapist                              PT Assessment/Plan     Row Name 07/17/18 1722          PT Assessment    Functional Limitations Limitation in home management;Performance in self-care ADL;Performance in work activities  -CD     Impairments Impaired flexibility  -CD     Assessment Comments Patient is really tight in the right breast areas with tight capsular contracture around the right expander. There were multiple areas of sift tissue dysfunction and by the end of treatment some of the tissue above the expander started to move.  -CD        PT Plan    PT Plan Comments Patient to work on self massage and learning to touch the right breast again next week and I have advised her that she may be sore. I will see her the following week after my vacation and I will then see her response to her initial treatment she may need 2 x per week therapy  -CD       User Key  (r) = Recorded By, (t) = Taken By, (c) = Cosigned By    Initials Name Provider Type    NABOR Silva, PT Physical Therapist                     Exercises     Row Name 07/17/18 1700             Subjective Comments    Subjective Comments Patient reported that she is tight and that it is referring pain. She has not been able to touch the right breast since her surgery as it is uncomfortable so she has not touched it  -CD         Subjective Pain    Able to rate subjective pain? yes  -CD      Pre-Treatment Pain Level 7  -CD      Post-Treatment Pain Level 7  -CD      Subjective Pain Comment right breast tightness and pain especially along the lateral chest border  -CD        User Key  (r) = Recorded By, (t) =  Taken By, (c) = Cosigned By    Initials Name Provider Type    CD Carolina Silva, PT Physical Therapist                              PT OP Goals     Row Name 07/17/18 1700          PT Short Term Goals    STG Date to Achieve 08/09/18  -     STG 1 Pt independent with HEP for general strengthening and flexibility to promote return to PLOF.   -CD     STG 2 Pt re-evaluated for ASTYM Rt breast tightness.   -CD     STG 2 Progress Met  -     STG 3 Soft tissue swelling/ scar tissue will be 25 % softer in right breast  -CD     STG 3 Progress New  -        Long Term Goals    LTG 1 Pt to report improved exercise tolerance to resume walking 3-4 times per week.   -CD     LTG 2 Pt to report improved energy for household and work tasks to improve QOL.   -CD     LTG 3 Pt independent with advanced HEP for strength, flexibility and endurance.   -CD     LTG 4 Pt to report improvement in piano playing.   -CD     LTG 5 Soft tissue swelling/ scar tissue will be 75 % softer in right breast  -     LTG 5 Progress New  -       User Key  (r) = Recorded By, (t) = Taken By, (c) = Cosigned By    Initials Name Provider Type    CD Carolina Silva, PT Physical Therapist          Therapy Education  Education Details: Astym therapy education on stretching and drinking plenty of water and she may be sore. Homework to massage and touch the right breast  Given: Symptoms/condition management, Other (comment) (tight scar tissue)  Program: New  How Provided: Verbal  Provided to: Patient  Level of Understanding: Verbalized              Time Calculation:   Start Time: 1101  Total Timed Code Minutes- PT: 29 minute(s)   Therapy Suggested Charges     Code   Minutes Charges    None           Therapy Charges for Today     Code Description Service Date Service Provider Modifiers Qty    44430126594 HC PT MANUAL THERAPY EA 15 MIN 7/17/2018 Carolina Silva, PT GP 2                    Carolina Silva, PT  7/17/2018

## 2018-08-02 ENCOUNTER — HOSPITAL ENCOUNTER (OUTPATIENT)
Dept: PHYSICAL THERAPY | Facility: HOSPITAL | Age: 58
Setting detail: THERAPIES SERIES
Discharge: HOME | End: 2018-08-02

## 2018-08-02 DIAGNOSIS — Z74.09 IMPAIRED FUNCTIONAL MOBILITY, BALANCE, AND ENDURANCE: Primary | ICD-10-CM

## 2018-08-02 DIAGNOSIS — Z74.09 IMPAIRED FUNCTIONAL MOBILITY, BALANCE, AND ENDURANCE: ICD-10-CM

## 2018-08-02 DIAGNOSIS — L90.5 SCAR CONDITION AND FIBROSIS OF SKIN: Primary | ICD-10-CM

## 2018-08-02 PROCEDURE — 97140 MANUAL THERAPY 1/> REGIONS: CPT

## 2018-08-02 PROCEDURE — 97110 THERAPEUTIC EXERCISES: CPT | Performed by: PHYSICAL THERAPIST

## 2018-08-02 NOTE — THERAPY TREATMENT NOTE
Outpatient Physical Therapy Ortho Treatment Note  [facility]     Patient Name: Jannette Burroughs  : 1960  MRN: [id number]  Today's Date: 2018      Visit Date: 2018    Visit Dx:    ICD-10-CM ICD-9-CM   1. Impaired functional mobility, balance, and endurance Z74.09 V49.89       Patient Active Problem List   Diagnosis   • Malignant neoplasm of lower-outer quadrant of breast of female, estrogen receptor positive (CMS/HCC)        Past Medical History:   Diagnosis Date   • Breast cancer (CMS/HCC)         Past Surgical History:   Procedure Laterality Date   • BREAST RECONSTRUCTION, BREAST TISSUE EXPANDER INSERTION Right 2017    Procedure: IMMEDIATE STAGED RIGHT BREAST RECONSTRUCTION WITH TISSUE EXPANDER AND ALLODERM;  Surgeon: Dottie Butler MD;  Location: [facility];  Service:    • D&C AND LAPAROSCOPY     • MASTECTOMY WITH SENTINEL NODE BIOPSY AND AXILLARY NODE DISSECTION Right 2017    Procedure: RIGHT NIPPLE SPARING BREAST MASTECTOMY WITH AXILLARY NODE DISSECTION;  Surgeon: Cristobal Luna MD;  Location: [facility];  Service:                      Lymphedema     Row Name 18          Subjective Pain    Able to rate subjective pain? yes  -MW     Pre-Treatment Pain Level 0  -MW     Post-Treatment Pain Level 0  -MW     Subjective Pain Comment  --        Subjective Comments    Subjective Comments Pt reports feeling good; no issues with exercises.   -MW       User Key  (r) = Recorded By, (t) = Taken By, (c) = Cosigned By    Initials Name Provider Type    Dee Singh, PT Physical Therapist    Carolina Aleman, PT Physical Therapist                    PT Assessment/Plan     Row Name 18 64       PT Assessment    Functional Limitations Limitation in home management;Performance in self-care ADL;Performance in work activities  -MW    Impairments Impaired flexibility  -MW    Assessment Comments Pt with good compliance with HEP; able to progress to more core and UE/  shoulder and trunk ROM. Pt with a more positive mood and increased energy.   -MW       PT Plan    PT Plan Comments Cont HEP; progress with ball exercises.   -MW      User Key  (r) = Recorded By, (t) = Taken By, (c) = Cosigned By    Initials Name Provider Type    Dee Singh, PT Physical Therapist    CD Carolina Silva, PT Physical Therapist                    Exercises     Row Name 08/02/18 0830       Subjective Comments    Subjective Comments Pt reports feeling good; no issues with exercises.   -MW       Subjective Pain    Able to rate subjective pain? yes  -MW    Pre-Treatment Pain Level 0  -MW    Post-Treatment Pain Level 0  -MW    Subjective Pain Comment  --       Total Minutes    48700 - PT Therapeutic Exercise Minutes 23  -MW    01364 - PT Manual Therapy Minutes  --       Exercise 1    Exercise Name 1 Wall squats with ball at back   -MW    Cueing 1 Verbal  -MW    Reps 1 5 x 2   -MW       Exercise 2    Exercise Name 2 Trunk ext against ball on wall   -MW    Cueing 2 Demo;Verbal  -MW    Reps 2 5 x 2   -MW    Additional Comments progressed from arms crossed at chest to full shoulder flexion stretch   -MW       Exercise 3    Exercise Name 3 Shoulder flexion seated on ball   -MW    Reps 3 5 x 2   -MW       Exercise 4    Exercise Name 4 Shoulder horizontal Abd with trunk rotation seated on ball   -MW    Cueing 4 Demo;Verbal  -MW    Reps 4 5 x 2   -MW    Additional Comments progressed from arm movement to trunk rotation to visual following   -MW       Exercise 5    Exercise Name 5 Seated on ball hip hip swirl CW and CCW   -MW    Cueing 5 Tactile;Verbal  -MW    Reps 5 5 x 2 x 2   -MW       Exercise 6    Exercise Name 6 Seated on ball marching   -MW    Cueing 6 Demo;Verbal  -MW    Reps 6 10 x 2   -MW    Additional Comments VC for trunk control and breathing   -MW       Exercise 7    Exercise Name 7 Seated on ball roll out / lower abs   -MW    Cueing 7 Demo;Verbal;Tactile  -MW    Reps 7 5 x 2   -MW        Exercise 8    Exercise Name 8 Scifit UE & LE   -MW    Time 8 5 min   -MW    Additional Comments no resistance   -MW      User Key  (r) = Recorded By, (t) = Taken By, (c) = Cosigned By    Initials Name Provider Type    Dee Singh, PT Physical Therapist    Carolina Aleman, PT Physical Therapist                             Therapy Education  Education Details: HEP: ball exer progression   Given: HEP, Posture/body mechanics  Program: Progressed  How Provided: Verbal, Demonstration, Written  Provided to: Patient  Level of Understanding: Verbalized, Demonstrated, Teach back education performed              Time Calculation:   Start Time: 0830  Total Timed Code Minutes- PT: 23 minute(s)  Therapy Suggested Charges     Code   Minutes Charges    86883 (CPT®) Hc Pt Neuromusc Re Education Ea 15 Min      28139 (CPT®) Hc Pt Ther Proc Ea 15 Min 23 2    66691 (CPT®) Hc Gait Training Ea 15 Min      19353 (CPT®) Hc Pt Therapeutic Act Ea 15 Min      38774 (CPT®) Hc Pt Manual Therapy Ea 15 Min      32868 (CPT®) Hc Pt Ther Massage- Per 15 Min      39928 (CPT®) Hc Pt Iontophoresis Ea 15 Min      75664 (CPT®) Hc Pt Elec Stim Ea-Per 15 Min      13663 (CPT®) Hc Pt Ultrasound Ea 15 Min      57395 (CPT®) Hc Pt Self Care/Mgmt/Train Ea 15 Min      Total  23 2        Therapy Charges for Today     Code Description Service Date Service Provider Modifiers Qty    74826516521 HC PT THER PROC EA 15 MIN 8/2/2018 Dee Chapin, PT GP 2                    Dee Chapin, PT  8/2/2018

## 2018-08-09 ENCOUNTER — HOSPITAL ENCOUNTER (OUTPATIENT)
Dept: PHYSICAL THERAPY | Facility: HOSPITAL | Age: 58
Setting detail: THERAPIES SERIES
Discharge: HOME | End: 2018-08-09

## 2018-08-09 DIAGNOSIS — L90.5 SCAR CONDITION AND FIBROSIS OF SKIN: Primary | ICD-10-CM

## 2018-08-09 PROCEDURE — 97140 MANUAL THERAPY 1/> REGIONS: CPT

## 2018-08-09 NOTE — THERAPY PROGRESS REPORT/RE-CERT
"    Outpatient Physical Therapy Lymphedema Progress Note  [facility]     Patient Name: Jannette Burroughs  : 1960  MRN: [id number]  Today's Date: 2018        Visit Date: 2018    Visit Dx:    ICD-10-CM ICD-9-CM   1. Scar condition and fibrosis of skin L90.5 709.2       Patient Active Problem List   Diagnosis   • Malignant neoplasm of lower-outer quadrant of breast of female, estrogen receptor positive (CMS/HCC)              Lymphedema     Row Name 18 0900             Subjective Pain    Able to rate subjective pain? yes  -CD      Pre-Treatment Pain Level 0  -CD      Post-Treatment Pain Level 0  -CD         Subjective Comments    Subjective Comments Patient reported that her right side and chest was still feeling better and she has increased movement in the right shoulder. Patient reported that she felt tight areas above the expander that were \"lumpy\". Patient also reported she went to [facility] for a consult for a skin flap but she thinks she will still do implant here.  -CD         Skin Changes/Observations    Skin Observations Comment tight skin laterally along the chest wall and the area under the expander is still very tight.  -CD         Manual Lymphatic Drainage    Manual Lymphatic Drainage astym  -CD      Astym scar(s) / incision line(s);anterior chest;anterior-lateral chest  -CD      Scar(s) / Incision Line(s) right  -CD      Anterior Chest right  -CD      Anterior-Lateral Chest right  -CD      Anterior-Lateral Chest Comment soft tissue massage and self massage taught for home - her  did not make it today  -CD         Compression/Skin Care    Compression/Skin Care skin care  -CD      Skin Care moisturizing lotion applied  -CD      Compression/Skin Care Comments advice on self massage at home  -CD        User Key  (r) = Recorded By, (t) = Taken By, (c) = Cosigned By    Initials Name Provider Type    Carolina Aleman PT Physical Therapist                  PT Ortho     Row Name " "08/09/18 0900       General ROM    RT Upper Ext Rt Shoulder Flexion  -CD    GENERAL ROM COMMENTS WNL - tightness noted laterally in the chest  -CD      User Key  (r) = Recorded By, (t) = Taken By, (c) = Cosigned By    Initials Name Provider Type    Carolina Aleman, PT Physical Therapist                        PT Assessment/Plan     Row Name 08/09/18 0950          PT Assessment    Functional Limitations Performance in self-care ADL;Limitations in community activities;Limitation in home management;Performance in work activities  -CD     Impairments Impaired flexibility;Impaired lymphatic circulation;Pain;Range of motion  -CD     Assessment Comments Patient is progressing well to increase right shoulder motion. She does have soft tissue dysfunction laterally and below the right expander and the tissue is starting to loosen up. Patient is working towards her goals  -CD     Rehab Potential Good  -CD     Patient/caregiver participated in establishment of treatment plan and goals Yes  -CD     Patient would benefit from skilled therapy intervention Yes  -CD        PT Plan    PT Frequency 1x/week  -CD     Planned CPT's? PT MANUAL THERAPY EA 15 MIN: 02269;PT THER PROC EA 15 MIN: 21111  -CD     PT Plan Comments Continue to progress over the next month  -CD       User Key  (r) = Recorded By, (t) = Taken By, (c) = Cosigned By    Initials Name Provider Type    Carolina Aleman, PT Physical Therapist                     Exercises     Row Name 08/09/18 0900             Subjective Comments    Subjective Comments Patient reported that her right side and chest was still feeling better and she has increased movement in the right shoulder. Patient reported that she felt tight areas above the expander that were \"lumpy\". Patient also reported she went to [facility] for a consult for a skin flap but she thinks she will still do implant here.  -CD         Subjective Pain    Able to rate subjective pain? yes  -CD      Pre-Treatment " Pain Level 0  -CD      Post-Treatment Pain Level 0  -CD        User Key  (r) = Recorded By, (t) = Taken By, (c) = Cosigned By    Initials Name Provider Type    Carolina Aleman, PT Physical Therapist                              PT OP Goals     Row Name 08/09/18 0900          PT Short Term Goals    STG Date to Achieve 09/09/18  -CD     STG 1 Pt independent with HEP for general strengthening and flexibility to promote return to PLOF.   -CD     STG 1 Progress Partially Met  -CD     STG 2 Pt re-evaluated for ASTYM Rt breast tightness.   -CD     STG 2 Progress Met  -CD     STG 3 Soft tissue swelling/ scar tissue will be 25 % softer in right breast  -CD     STG 3 Progress Partially Met  -CD        Long Term Goals    LTG 1 Pt to report improved exercise tolerance to resume walking 3-4 times per week.   -CD     LTG 1 Progress Ongoing  -CD     LTG 2 Pt to report improved energy for household and work tasks to improve QOL.   -CD     LTG 2 Progress Ongoing  -CD     LTG 3 Pt independent with advanced HEP for strength, flexibility and endurance.   -CD     LTG 3 Progress Ongoing  -CD     LTG 4 Pt to report improvement in piano playing.   -CD     LTG 4 Progress Ongoing  -CD     LTG 5 Soft tissue swelling/ scar tissue will be 75 % softer in right breast  -CD     LTG 5 Progress Ongoing  -CD       User Key  (r) = Recorded By, (t) = Taken By, (c) = Cosigned By    Initials Name Provider Type    Carolina Aleman, PT Physical Therapist          Therapy Education  Given: Symptoms/condition management, Posture/body mechanics, Edema management  Program: Reinforced  How Provided: Verbal  Provided to: Patient  Level of Understanding: Verbalized              Time Calculation:   Start Time: 0902  Total Timed Code Minutes- PT: 35 minute(s)   Therapy Suggested Charges     Code   Minutes Charges    None           Therapy Charges for Today     Code Description Service Date Service Provider Modifiers Qty    06316646896  PT MANUAL THERAPY EA  15 MIN 8/9/2018 Carolina Silva, PT GP 2                    Carolina Silva, PT  8/9/2018

## 2018-08-23 ENCOUNTER — HOSPITAL ENCOUNTER (OUTPATIENT)
Dept: PHYSICAL THERAPY | Facility: HOSPITAL | Age: 58
Setting detail: THERAPIES SERIES
Discharge: HOME | End: 2018-08-23

## 2018-08-23 DIAGNOSIS — Z74.09 IMPAIRED FUNCTIONAL MOBILITY, BALANCE, AND ENDURANCE: ICD-10-CM

## 2018-08-23 DIAGNOSIS — L90.5 SCAR CONDITION AND FIBROSIS OF SKIN: Primary | ICD-10-CM

## 2018-08-23 PROCEDURE — 97140 MANUAL THERAPY 1/> REGIONS: CPT

## 2018-08-23 NOTE — THERAPY PROGRESS REPORT/RE-CERT
Outpatient Physical Therapy Lymphedema Progress Note  [facility]     Patient Name: Jannette Burroughs  : 1960  MRN: [id number]  Today's Date: 2018        Visit Date: 2018    Visit Dx:    ICD-10-CM ICD-9-CM   1. Scar condition and fibrosis of skin L90.5 709.2   2. Impaired functional mobility, balance, and endurance Z74.09 V49.89       Patient Active Problem List   Diagnosis   • Malignant neoplasm of lower-outer quadrant of breast of female, estrogen receptor positive (CMS/HCC)              Lymphedema     Row Name 18 1100             Subjective Pain    Able to rate subjective pain? yes  -CD      Pre-Treatment Pain Level 0  -CD      Post-Treatment Pain Level 0  -CD      Subjective Pain Comment less tight and no score given today  -CD         Subjective Comments    Subjective Comments Patient reported that she continues to feel better and that she has been back to work a little. She is going to see Dr Butler this afternoon for a follow up to discuss surgery at the end of september  -         Skin Changes/Observations    Skin Observations Comment still tight skin laterally into the armpit as well as under the expander on the right  -CD         Manual Lymphatic Drainage    Manual Lymphatic Drainage astym  -CD      Astym scar(s) / incision line(s);anterior chest;anterior-lateral chest  -CD      Scar(s) / Incision Line(s) right  -CD      Anterior Chest right  -CD      Anterior-Lateral Chest right  -CD      Anterior-Lateral Chest Comment soft tissue massage after  -CD         Compression/Skin Care    Compression/Skin Care skin care  -CD      Skin Care moisturizing lotion applied  -CD      Compression/Skin Care Comments advice on activity  -CD        User Key  (r) = Recorded By, (t) = Taken By, (c) = Cosigned By    Initials Name Provider Type    Carolina Aleman PT Physical Therapist                  PT Ortho     Row Name 18 1100       General ROM    GENERAL ROM COMMENTS Upper extremity  "WFL but not there is extra \"give\" in the flexion and abduction end of range   -CD      User Key  (r) = Recorded By, (t) = Taken By, (c) = Cosigned By    Initials Name Provider Type    Carolina Aleman, PT Physical Therapist                        PT Assessment/Plan     Row Name 08/23/18 1159          PT Assessment    Functional Limitations Performance in self-care ADL;Limitation in home management;Limitations in community activities;Performance in work activities  -CD     Impairments Range of motion;Muscle strength;Impaired flexibility;Impaired postural alignment;Endurance  -CD     Assessment Comments Patient is progressing well and improving her flexibility in the right shoulder. The center of the axilla is tight and still looks visibly tight. Under the right expander is loosening too. There is soft tissue dyfunction in all areas still but it is breaking up and loosening. Patient is working towards her goals.  -CD     Rehab Potential Good  -CD     Patient/caregiver participated in establishment of treatment plan and goals Yes  -CD     Patient would benefit from skilled therapy intervention Yes  -CD        PT Plan    PT Frequency 1x/week  -CD     PT Plan Comments Continue to work on the scar tissue. Patient goes to see her MD today to discuss her next surgery.  -CD       User Key  (r) = Recorded By, (t) = Taken By, (c) = Cosigned By    Initials Name Provider Type    Carolina Aleman, PT Physical Therapist                     Exercises     Row Name 08/23/18 1100             Subjective Comments    Subjective Comments Patient reported that she continues to feel better and that she has been back to work a little. She is going to see Dr Butler this afternoon for a follow up to discuss surgery at the end of september  -         Subjective Pain    Able to rate subjective pain? yes  -CD      Pre-Treatment Pain Level 0  -CD      Post-Treatment Pain Level 0  -CD      Subjective Pain Comment less tight and no score given " today  -CD        User Key  (r) = Recorded By, (t) = Taken By, (c) = Cosigned By    Initials Name Provider Type    Carolina Aleman, PT Physical Therapist                              PT OP Goals     Row Name 08/23/18 1100          PT Short Term Goals    STG Date to Achieve 09/09/18  -CD     STG 1 Pt independent with HEP for general strengthening and flexibility to promote return to PLOF.   -CD     STG 1 Progress Partially Met  -CD     STG 2 Pt re-evaluated for ASTYM Rt breast tightness.   -CD     STG 2 Progress Met  -CD     STG 3 Soft tissue swelling/ scar tissue will be 25 % softer in right breast  -CD     STG 3 Progress Met  -CD        Long Term Goals    LTG Date to Achieve 09/23/18  -CD     LTG 1 Pt to report improved exercise tolerance to resume walking 3-4 times per week.   -CD     LTG 1 Progress Ongoing  -CD     LTG 2 Pt to report improved energy for household and work tasks to improve QOL.   -CD     LTG 2 Progress Ongoing  -CD     LTG 3 Pt independent with advanced HEP for strength, flexibility and endurance.   -CD     LTG 3 Progress Ongoing  -CD     LTG 4 Pt to report improvement in piano playing.   -CD     LTG 4 Progress Ongoing  -CD     LTG 5 Soft tissue swelling/ scar tissue will be 75 % softer in right breast  -CD     LTG 5 Progress Ongoing  -CD       User Key  (r) = Recorded By, (t) = Taken By, (c) = Cosigned By    Initials Name Provider Type    Carolina Aleman, PT Physical Therapist          Therapy Education  Given: Symptoms/condition management, Posture/body mechanics  Program: Reinforced  How Provided: Verbal  Provided to: Patient  Level of Understanding: Verbalized              Time Calculation:   Start Time: 0931  Total Timed Code Minutes- PT: 25 minute(s)   Therapy Suggested Charges     Code   Minutes Charges    None           Therapy Charges for Today     Code Description Service Date Service Provider Modifiers Qty    58528466656 HC PT MANUAL THERAPY EA 15 MIN 8/23/2018 Carolina Silva,  PT GP 2                    Carolina Silva, PT  8/23/2018

## 2018-09-06 ENCOUNTER — HOSPITAL ENCOUNTER (OUTPATIENT)
Dept: PHYSICAL THERAPY | Facility: HOSPITAL | Age: 58
Setting detail: THERAPIES SERIES
Discharge: HOME | End: 2018-09-06

## 2018-09-06 DIAGNOSIS — L90.5 SCAR CONDITION AND FIBROSIS OF SKIN: Primary | ICD-10-CM

## 2018-09-06 PROCEDURE — 97140 MANUAL THERAPY 1/> REGIONS: CPT

## 2018-09-06 NOTE — THERAPY PROGRESS REPORT/RE-CERT
Outpatient Physical Therapy Lymphedema Progress Note  [facility]     Patient Name: Jannette Burroughs  : 1960  MRN: [id number]  Today's Date: 2018        Visit Date: 2018    Visit Dx:    ICD-10-CM ICD-9-CM   1. Scar condition and fibrosis of skin L90.5 709.2       Patient Active Problem List   Diagnosis   • Malignant neoplasm of lower-outer quadrant of breast of female, estrogen receptor positive (CMS/HCC)              Lymphedema     Row Name 18 1000             Subjective Pain    Able to rate subjective pain? yes  -CD      Pre-Treatment Pain Level 0  -CD      Post-Treatment Pain Level 0  -CD         Subjective Comments    Subjective Comments Patient reported that the left harvinder eof her chest feels like it is a steel bra it feels that tight. Patient reported that Dr Butler was pleased with progress and wants to try more PT and adding saline to expand the expander before surgery. 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  -CD         Skin Changes/Observations    Skin Observations Comment tight skin over the right breast and chest region  -CD         Manual Lymphatic Drainage    Manual Lymphatic Drainage astym  -CD      Astym scar(s) / incision line(s);anterior chest;anterior-lateral chest  -CD      Scar(s) / Incision Line(s) right  -CD      Anterior Chest right  -CD      Anterior-Lateral Chest right  -CD      Anterior-Lateral Chest Comment soft tissue massage after to try and loosen up the tissue over the expander  -CD        User Key  (r) = Recorded By, (t) = Taken By, (c) = Cosigned By    Initials Name Provider Type    CD Carolina Silva, PT Physical Therapist                              PT Assessment/Plan     Row Name 18 1048          PT Assessment    Functional Limitations Performance in self-care ADL;Limitation in home management  -CD     Impairments Impaired lymphatic circulation;Range of motion;Impaired flexibility  -CD     Assessment Comments Patient is doing well and the  tissue post tissue was a little looser ready for patient to see her MD to increase the expander. Patient is now back at work so is happy and able to do everything she needs. Soft tissue dysfunciton around the right expander and the tissue continues to be tight but can be influended with the Astym therapy.  -CD     Rehab Potential Good  -CD     Patient/caregiver participated in establishment of treatment plan and goals Yes  -CD     Patient would benefit from skilled therapy intervention Yes  -CD        PT Plan    PT Frequency 1x/week  -CD     Planned CPT's? PT MANUAL THERAPY EA 15 MIN: 24994;PT THER PROC EA 15 MIN: 42544  -CD     PT Plan Comments 4 weeks with Pt going to see her MD post treatment for increased expansion  -CD       User Key  (r) = Recorded By, (t) = Taken By, (c) = Cosigned By    Initials Name Provider Type    Carolina Aleman, PT Physical Therapist                     Exercises     Row Name 09/06/18 1000             Subjective Comments    Subjective Comments Patient reported that the left harvinder eof her chest feels like it is a steel bra it feels that tight. Patient reported that Dr Butler was pleased with progress and wants to try more PT and adding saline to expand the expander before surgery. 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 yes  -CD      Pre-Treatment Pain Level 0  -CD      Post-Treatment Pain Level 0  -CD        User Key  (r) = Recorded By, (t) = Taken By, (c) = Cosigned By    Initials Name Provider Type    Carolina Aleman, PT Physical Therapist                              PT OP Goals     Row Name 09/06/18 1000          PT Short Term Goals    STG Date to Achieve 09/23/18  -CD     STG 1 Pt independent with HEP for general strengthening and flexibility to promote return to PLOF.   -CD     STG 1 Progress Met  -CD     STG 2 Pt re-evaluated for ASTYM Rt breast tightness.   -CD     STG 2 Progress Met  -CD     STG 3 Soft  tissue swelling/ scar tissue will be 25 % softer in right breast  -CD     STG 3 Progress Met  -        Long Term Goals    LTG Date to Achieve 10/06/18  -     LTG 1 Pt to report improved exercise tolerance to resume walking 3-4 times per week.   -     LTG 1 Progress Met  -CD     LTG 2 Pt to report improved energy for household and work tasks to improve QOL.   -CD     LTG 2 Progress Partially Met  -CD     LTG 3 Pt independent with advanced HEP for strength, flexibility and endurance.   -CD     LTG 3 Progress Partially Met  -CD     LTG 4 Pt to report improvement in piano playing.   -CD     LTG 4 Progress Ongoing  -     LTG 5 Soft tissue swelling/ scar tissue will be 75 % softer in right breast  -     LTG 5 Progress Ongoing  -       User Key  (r) = Recorded By, (t) = Taken By, (c) = Cosigned By    Initials Name Provider Type     Carolina Silva, PT Physical Therapist          Therapy Education  Given: Symptoms/condition management, Posture/body mechanics  How Provided: Verbal  Provided to: Patient  Level of Understanding: Verbalized              Time Calculation:   Start Time: 1002  Total Timed Code Minutes- PT: 35 minute(s)   Therapy Suggested Charges     Code   Minutes Charges    None           Therapy Charges for Today     Code Description Service Date Service Provider Modifiers Qty    99278208246 HC PT MANUAL THERAPY EA 15 MIN 9/6/2018 Carolina Silva, PT GP 2                    Carolina Silva, PT  9/6/2018

## 2018-09-11 ENCOUNTER — HOSPITAL ENCOUNTER (OUTPATIENT)
Dept: PHYSICAL THERAPY | Facility: HOSPITAL | Age: 58
Setting detail: THERAPIES SERIES
Discharge: HOME | End: 2018-09-11

## 2018-09-11 DIAGNOSIS — L90.5 SCAR CONDITION AND FIBROSIS OF SKIN: Primary | ICD-10-CM

## 2018-09-11 PROCEDURE — 97140 MANUAL THERAPY 1/> REGIONS: CPT

## 2018-09-11 NOTE — THERAPY TREATMENT NOTE
Outpatient Physical Therapy Lymphedema Treatment Note  [facility]     Patient Name: Jannette Burroughs  : 1960  MRN: [id number]  Today's Date: 2018        Visit Date: 2018    Visit Dx:    ICD-10-CM ICD-9-CM   1. Scar condition and fibrosis of skin L90.5 709.2       Patient Active Problem List   Diagnosis   • Malignant neoplasm of lower-outer quadrant of breast of female, estrogen receptor positive (CMS/HCC)              Lymphedema     Row Name 18 1500             Subjective Pain    Able to rate subjective pain? yes  -CD      Pre-Treatment Pain Level 0  -CD      Post-Treatment Pain Level 0  -CD         Subjective Comments    Subjective Comments Patient reported that she went to her expander fill last week and that she was able to have an increased amount of saline put in following her PT treatment so she was pleased  -CD         Skin Changes/Observations    Skin Observations Comment Again very tight skin over the expander on palpation. No open areas  -CD         Manual Lymphatic Drainage    Manual Lymphatic Drainage astym  -CD      Astym scar(s) / incision line(s);anterior chest;anterior-lateral chest  -CD      Scar(s) / Incision Line(s) right  -CD      Anterior Chest right  -CD      Anterior-Lateral Chest right  -CD      Anterior-Lateral Chest Comment Soft tissue massage in all directions over the expander after with lifting the skin in all directions.  -CD      Manual Lymphatic Drainage Comments Taught patient self massage to continue this at home  -CD         Compression/Skin Care    Compression/Skin Care skin care  -CD      Skin Care moisturizing lotion applied  -CD      Compression/Skin Care Comments work on moving the skin in between PT to help with skin mobility  -CD        User Key  (r) = Recorded By, (t) = Taken By, (c) = Cosigned By    Initials Name Provider Type    Carolina Aleman, PT Physical Therapist                              PT Assessment/Plan     Row Name 18 1553           PT Assessment    Impairments Impaired flexibility  -CD     Assessment Comments Patient is progressing well and there was some movement over the expander after treatment. Patient verbalised understanding of all the self treatment. Still soft tissue dysfunction under the expander and into the right upper arm/  pec region.  -CD        PT Plan    PT Plan Comments Continue to progress and work on patient next week before she sees her MD for another fill.  -CD       User Key  (r) = Recorded By, (t) = Taken By, (c) = Cosigned By    Initials Name Provider Type    Carolina Aleman, PT Physical Therapist                     Exercises     Row Name 09/11/18 1500             Subjective Comments    Subjective Comments Patient reported that she went to her expander fill last week and that she was able to have an increased amount of saline put in following her PT treatment so she was pleased  -CD         Subjective Pain    Able to rate subjective pain? yes  -CD      Pre-Treatment Pain Level 0  -CD      Post-Treatment Pain Level 0  -CD        User Key  (r) = Recorded By, (t) = Taken By, (c) = Cosigned By    Initials Name Provider Type    Carolina Aleman, PT Physical Therapist                            Therapy Education  Given: Symptoms/condition management, Posture/body mechanics  Program: Reinforced  How Provided: Verbal  Provided to: Patient  Level of Understanding: Verbalized              Time Calculation:   Start Time: 1035  Total Timed Code Minutes- PT: 35 minute(s)   Therapy Suggested Charges     Code   Minutes Charges    None           Therapy Charges for Today     Code Description Service Date Service Provider Modifiers Qty    70406061335  PT MANUAL THERAPY EA 15 MIN 9/11/2018 Carolina Silva, PT GP 2                    Carolina Silva PT  9/11/2018

## 2018-09-18 ENCOUNTER — HOSPITAL ENCOUNTER (OUTPATIENT)
Dept: PHYSICAL THERAPY | Facility: HOSPITAL | Age: 58
Setting detail: THERAPIES SERIES
Discharge: HOME | End: 2018-09-18

## 2018-09-18 DIAGNOSIS — L90.5 SCAR CONDITION AND FIBROSIS OF SKIN: Primary | ICD-10-CM

## 2018-09-18 PROCEDURE — 97140 MANUAL THERAPY 1/> REGIONS: CPT

## 2018-09-18 NOTE — THERAPY TREATMENT NOTE
Outpatient Physical Therapy Lymphedema Treatment Note  [facility]     Patient Name: Jannette Burroughs  : 1960  MRN: [id number]  Today's Date: 2018        Visit Date: 2018    Visit Dx:    ICD-10-CM ICD-9-CM   1. Scar condition and fibrosis of skin L90.5 709.2       Patient Active Problem List   Diagnosis   • Malignant neoplasm of lower-outer quadrant of breast of female, estrogen receptor positive (CMS/HCC)              Lymphedema     Row Name 18 1100             Subjective Pain    Able to rate subjective pain? yes  -CD      Pre-Treatment Pain Level 0  -CD      Post-Treatment Pain Level 0  -CD         Subjective Comments    Subjective Comments Patient reported that she had just been for her expander fill and her MD was really pleased and was able to get 30 ml in to the expander. Last time it was only 20 ml so that is good progress.  -CD         Skin Changes/Observations    Skin Observations Comment tight expander but she has just been filled. On palpation there was little skin movement over the expander and around on the chest wall.  -CD         Manual Lymphatic Drainage    Manual Lymphatic Drainage astym  -CD      Astym scar(s) / incision line(s);anterior chest;anterior-lateral chest  -CD      Scar(s) / Incision Line(s) right  -CD      Anterior Chest right  -CD      Anterior-Lateral Chest right  -CD      Anterior-Lateral Chest Comment soft tissue and scar tissue work on the right side of the chest  -CD      Manual Lymphatic Drainage Comments patient reported that she is having difficulty doing the self massage after a fill as there is little movement but after PT patient reported that skin moves better and she has some movement  -CD         Compression/Skin Care    Compression/Skin Care skin care  -CD      Skin Care moisturizing lotion applied  -CD      Compression/Skin Care Comments continue to work on the right expander and the tissue underneath the area  -CD        User Key  (r) =  Recorded By, (t) = Taken By, (c) = Cosigned By    Initials Name Provider Type    Carolina Aleman, PT Physical Therapist                              PT Assessment/Plan     Row Name 09/18/18 1129          PT Assessment    Functional Limitations Performance in self-care ADL;Limitation in home management;Performance in work activities;Limitations in community activities  -CD     Impairments Impaired flexibility  -CD     Assessment Comments Patient was really tight in the expander and surrounding tissue. After Astym therapy there started to be some movement of the skin on the expander as well as under the breast area. No  pinkness and the tissue was feeling good.  -CD        PT Plan    PT Plan Comments Continue one per week. Patient goes back for another fill in 3 weeks I really stressed to try and schedule her PT pre the fill so that we can get the tissue moving as much as we can to allow increased fluid in the expander.  -CD       User Key  (r) = Recorded By, (t) = Taken By, (c) = Cosigned By    Initials Name Provider Type    Carolina Aleman, PT Physical Therapist                     Exercises     Row Name 09/18/18 1100             Subjective Comments    Subjective Comments Patient reported that she had just been for her expander fill and her MD was really pleased and was able to get 30 ml in to the expander. Last time it was only 20 ml so that is good progress.  -CD         Subjective Pain    Able to rate subjective pain? yes  -CD      Pre-Treatment Pain Level 0  -CD      Post-Treatment Pain Level 0  -CD        User Key  (r) = Recorded By, (t) = Taken By, (c) = Cosigned By    Initials Name Provider Type    Carolina Aleman, PT Physical Therapist                            Therapy Education  Given: Symptoms/condition management, Posture/body mechanics  Program: Reinforced  How Provided: Verbal  Provided to: Patient  Level of Understanding: Verbalized              Time Calculation:   Start Time: 1000  Total  Timed Code Minutes- PT: 32 minute(s)   Therapy Suggested Charges     Code   Minutes Charges    None           Therapy Charges for Today     Code Description Service Date Service Provider Modifiers Qty    24795648278 HC PT MANUAL THERAPY EA 15 MIN 9/18/2018 Carolina Silva, PT GP 2                    Carolina Silva, PT  9/18/2018

## 2018-09-25 ENCOUNTER — HOSPITAL ENCOUNTER (OUTPATIENT)
Dept: PHYSICAL THERAPY | Facility: HOSPITAL | Age: 58
Setting detail: THERAPIES SERIES
Discharge: HOME | End: 2018-09-25

## 2018-09-25 DIAGNOSIS — Z74.09 IMPAIRED FUNCTIONAL MOBILITY, BALANCE, AND ENDURANCE: ICD-10-CM

## 2018-09-25 DIAGNOSIS — L90.5 SCAR CONDITION AND FIBROSIS OF SKIN: Primary | ICD-10-CM

## 2018-09-25 PROCEDURE — 97140 MANUAL THERAPY 1/> REGIONS: CPT

## 2018-09-25 NOTE — THERAPY TREATMENT NOTE
Outpatient Physical Therapy Lymphedema Treatment Note  [facility]     Patient Name: Jannette Burroughs  : 1960  MRN: [id number]  Today's Date: 2018        Visit Date: 2018    Visit Dx:    ICD-10-CM ICD-9-CM   1. Scar condition and fibrosis of skin L90.5 709.2   2. Impaired functional mobility, balance, and endurance Z74.09 V49.89       Patient Active Problem List   Diagnosis   • Malignant neoplasm of lower-outer quadrant of breast of female, estrogen receptor positive (CMS/HCC)              Lymphedema     Row Name 18 1500             Subjective Pain    Able to rate subjective pain? yes  -CD      Pre-Treatment Pain Level 0  -CD      Post-Treatment Pain Level 0  -CD         Subjective Comments    Subjective Comments Patient reported that she has been working on her right breast all this past week as she really wants to have her surgery this year. Patient has not had a fill today and thinks it is in 2 weeks. Patient reported that she was tired and that she may have done too much in the office last week.  -CD         Skin Changes/Observations    Skin Observations Comment some movement of the tissue over the expander and laterally today when palpated  -CD         Manual Lymphatic Drainage    Manual Lymphatic Drainage astym  -CD      Astym scar(s) / incision line(s);anterior chest;anterior-lateral chest  -CD      Scar(s) / Incision Line(s) right  -CD      Anterior Chest right  -CD      Anterior-Lateral Chest right  -CD      Anterior-Lateral Chest Comment soft tissue massage to the whole areas above and below the expander and laterally. I worked on trying to lift the skin under the expander up so it can move more over the expander  -CD         Compression/Skin Care    Compression/Skin Care skin care  -CD      Skin Care moisturizing lotion applied  -CD      Compression/Skin Care Comments patient to continue at home  -CD        User Key  (r) = Recorded By, (t) = Taken By, (c) = Cosigned By     Initials Name Provider Type    Carolina Aleman, PT Physical Therapist                              PT Assessment/Plan     Row Name 09/25/18 1540          PT Assessment    Functional Limitations Limitation in home management;Performance in work activities;Limitations in community activities;Performance in self-care ADL  -CD     Impairments Impaired flexibility  -CD     Assessment Comments Soft tissue dysfunction present under and laterally to the expander. The skin is starting to move with manual therapy and loosen up at the end of treatment. Patient verbalised understanding to continue at home. Patient is motivated to try and have surgery this year.  -CD        PT Plan    PT Plan Comments Continue next week  -CD       User Key  (r) = Recorded By, (t) = Taken By, (c) = Cosigned By    Initials Name Provider Type    Carolina Aleman, PT Physical Therapist                     Exercises     Row Name 09/25/18 1500             Subjective Comments    Subjective Comments Patient reported that she has been working on her right breast all this past week as she really wants to have her surgery this year. Patient has not had a fill today and thinks it is in 2 weeks. Patient reported that she was tired and that she may have done too much in the office last week.  -CD         Subjective Pain    Able to rate subjective pain? yes  -CD      Pre-Treatment Pain Level 0  -CD      Post-Treatment Pain Level 0  -CD        User Key  (r) = Recorded By, (t) = Taken By, (c) = Cosigned By    Initials Name Provider Type    Carolina Aleman, PT Physical Therapist                            Therapy Education  Given: Symptoms/condition management, Posture/body mechanics  Program: Reinforced  How Provided: Verbal  Provided to: Patient  Level of Understanding: Verbalized              Time Calculation:   Start Time: 0930  Total Timed Code Minutes- PT: 29 minute(s)   Therapy Suggested Charges     Code   Minutes Charges    None            Therapy Charges for Today     Code Description Service Date Service Provider Modifiers Qty    24872401552  PT MANUAL THERAPY EA 15 MIN 9/25/2018 Carolina Silva, PT GP 2                    Carolina Silva, PT  9/25/2018

## 2018-09-27 ENCOUNTER — APPOINTMENT (OUTPATIENT)
Dept: PHYSICAL THERAPY | Facility: HOSPITAL | Age: 58
End: 2018-09-27

## 2018-10-02 ENCOUNTER — HOSPITAL ENCOUNTER (OUTPATIENT)
Dept: PHYSICAL THERAPY | Facility: HOSPITAL | Age: 58
Setting detail: THERAPIES SERIES
Discharge: HOME | End: 2018-10-02

## 2018-10-02 DIAGNOSIS — L90.5 SCAR CONDITION AND FIBROSIS OF SKIN: Primary | ICD-10-CM

## 2018-10-02 PROCEDURE — 97140 MANUAL THERAPY 1/> REGIONS: CPT

## 2018-10-02 NOTE — THERAPY TREATMENT NOTE
Outpatient Physical Therapy Lymphedema Treatment Note  [facility]     Patient Name: Jannette Burroughs  : 1960  MRN: [id number]  Today's Date: 10/2/2018        Visit Date: 10/02/2018    Visit Dx:    ICD-10-CM ICD-9-CM   1. Scar condition and fibrosis of skin L90.5 709.2       Patient Active Problem List   Diagnosis   • Malignant neoplasm of lower-outer quadrant of breast of female, estrogen receptor positive (CMS/HCC)              Lymphedema     Row Name 10/02/18 1100             Subjective Pain    Able to rate subjective pain? yes  -CD      Pre-Treatment Pain Level 0  -CD      Post-Treatment Pain Level 0  -CD         Subjective Comments    Subjective Comments Patient reported that she is doing ok. She is going on vacation next week to see her Mum and Sister in [place]. She has not had a fill this week  -CD         Skin Changes/Observations    Skin Observations Comment still tight laterally and across the expander. It is difficulty to get the tissue to loosen as well as the lateral chest is tight . rounded shoulder posture too  -CD         Manual Lymphatic Drainage    Manual Lymphatic Drainage astym  -CD      Astym scar(s) / incision line(s)  -CD      Scar(s) / Incision Line(s) right  -CD      Anterior Chest right  -CD      Anterior-Lateral Chest right  -CD      Anterior-Lateral Chest Comment scar tissue massage after  -CD         Compression/Skin Care    Compression/Skin Care skin care  -CD      Skin Care moisturizing lotion applied  -CD      Compression/Skin Care Comments AROM WFL as well as strength and patient is back to work but still fatigued.   -CD        User Key  (r) = Recorded By, (t) = Taken By, (c) = Cosigned By    Initials Name Provider Type    CD Carolina Silva, PT Physical Therapist                              PT Assessment/Plan     Row Name 10/02/18 1400          PT Assessment    Impairments Impaired flexibility  -CD     Assessment Comments Soft tissue dysfunction around the expander.  The skin was tight pretreatment then loosened up by the end of treatment. Patient is not having another fill for a couple of weeks but the skin is loosening up in preparation for a fill.  -CD        PT Plan    PT Plan Comments Continue in 2 weeks after patient's vacation. Patient is only continuing with the manual therapy to loosen up the skin around the implant in preparation for exchange. She is continuing at home with her exercise program.  -CD       User Key  (r) = Recorded By, (t) = Taken By, (c) = Cosigned By    Initials Name Provider Type    Carolina Aleman, PT Physical Therapist                     Exercises     Row Name 10/02/18 1100             Subjective Comments    Subjective Comments Patient reported that she is doing ok. She is going on vacation next week to see her Mum and Sister in [place]. She has not had a fill this week  -CD         Subjective Pain    Able to rate subjective pain? yes  -CD      Pre-Treatment Pain Level 0  -CD      Post-Treatment Pain Level 0  -CD        User Key  (r) = Recorded By, (t) = Taken By, (c) = Cosigned By    Initials Name Provider Type    Carolina Aleman, PT Physical Therapist                            Therapy Education  Given: Symptoms/condition management, Posture/body mechanics  Program: Reinforced  How Provided: Verbal  Provided to: Patient  Level of Understanding: Verbalized              Time Calculation:   Start Time: 1102  Total Timed Code Minutes- PT: 35 minute(s)   Therapy Suggested Charges     Code   Minutes Charges    None           Therapy Charges for Today     Code Description Service Date Service Provider Modifiers Qty    34932781600 HC PT MANUAL THERAPY EA 15 MIN 10/2/2018 Carolina Silva, PT GP 2                    Carolina Silva PT  10/2/2018

## 2018-10-23 ENCOUNTER — HOSPITAL ENCOUNTER (OUTPATIENT)
Dept: PHYSICAL THERAPY | Facility: HOSPITAL | Age: 58
Setting detail: THERAPIES SERIES
Discharge: HOME | End: 2018-10-23

## 2018-10-23 DIAGNOSIS — L90.5 SCAR CONDITION AND FIBROSIS OF SKIN: Primary | ICD-10-CM

## 2018-10-23 DIAGNOSIS — Z74.09 IMPAIRED FUNCTIONAL MOBILITY, BALANCE, AND ENDURANCE: ICD-10-CM

## 2018-10-23 PROCEDURE — 97140 MANUAL THERAPY 1/> REGIONS: CPT

## 2018-10-23 NOTE — THERAPY PROGRESS REPORT/RE-CERT
Outpatient Physical Therapy Lymphedema Progress Note  [facility]     Patient Name: Jannette Burroughs  : 1960  MRN: [id number]  Today's Date: 10/23/2018        Visit Date: 10/23/2018    Visit Dx:    ICD-10-CM ICD-9-CM   1. Scar condition and fibrosis of skin L90.5 709.2   2. Impaired functional mobility, balance, and endurance Z74.09 V49.89       Patient Active Problem List   Diagnosis   • Malignant neoplasm of lower-outer quadrant of breast of female, estrogen receptor positive (CMS/HCC)              Lymphedema     Row Name 10/23/18 1200             Subjective Pain    Able to rate subjective pain? yes  -CD      Pre-Treatment Pain Level 0  -CD      Post-Treatment Pain Level 0  -CD         Subjective Comments    Subjective Comments Patient reported that she has been away for 2 weeks one week in [place] with her family and then the other week she went to [place]. She has been doing well and has been back to work  -CD         Skin Changes/Observations    Skin Observations Comment lateral chest tightness but the tissue over the expander is loosening  -CD         Manual Lymphatic Drainage    Manual Lymphatic Drainage astym  -CD      Astym scar(s) / incision line(s)  -CD      Scar(s) / Incision Line(s) right  -CD      Anterior Chest right  -CD      Anterior-Lateral Chest right  -CD      Manual Lymphatic Drainage Comments with arm in flexion I worked on the right lateral chest into the axilla  -CD         Compression/Skin Care    Compression/Skin Care skin care  -CD      Skin Care moisturizing lotion applied  -CD      Compression/Skin Care Comments good ROM of motion  -CD        User Key  (r) = Recorded By, (t) = Taken By, (c) = Cosigned By    Initials Name Provider Type    Carolina Aleman, PT Physical Therapist                              PT Assessment/Plan     Row Name 10/23/18 1234          PT Assessment    Functional Limitations Performance in self-care ADL;Limitation in home management  -CD      Impairments Impaired flexibility  -CD     Assessment Comments Good progress and the rright side tissue over the expander is continuing to loosen up. There was soft tissue dysfunction throughout the region but the tissue is starting to move more. I also thought the color of the skin over the expander was lighter   -CD     Rehab Potential Good  -CD     Patient/caregiver participated in establishment of treatment plan and goals Yes  -CD     Patient would benefit from skilled therapy intervention Yes  -CD        PT Plan    PT Frequency 1x/week  -CD     Predicted Duration of Therapy Intervention (Therapy Eval) 4 weeks  -CD     Planned CPT's? PT MANUAL THERAPY EA 15 MIN: 91524  -CD     PT Plan Comments Continue next week  -CD       User Key  (r) = Recorded By, (t) = Taken By, (c) = Cosigned By    Initials Name Provider Type    Carolina Aleman, PT Physical Therapist                     Exercises     Row Name 10/23/18 1200             Subjective Comments    Subjective Comments Patient reported that she has been away for 2 weeks one week in [place] with her family and then the other week she went to [place]. She has been doing well and has been back to work  -CD         Subjective Pain    Able to rate subjective pain? yes  -CD      Pre-Treatment Pain Level 0  -CD      Post-Treatment Pain Level 0  -CD        User Key  (r) = Recorded By, (t) = Taken By, (c) = Cosigned By    Initials Name Provider Type    Carolina Aleman, PT Physical Therapist                              PT OP Goals     Row Name 10/23/18 1200          PT Short Term Goals    STG Date to Achieve 11/23/18  -CD     STG 1 Pt independent with HEP for general strengthening and flexibility to promote return to PLOF.   -CD     STG 1 Progress Met  -CD     STG 2 Pt re-evaluated for ASTYM Rt breast tightness.   -CD     STG 2 Progress Met  -CD     STG 3 Soft tissue swelling/ scar tissue will be 25 % softer in right breast  -CD     STG 3 Progress Met  -CD         Long Term Goals    LTG Date to Achieve 12/06/18  -     LTG 1 Pt to report improved exercise tolerance to resume walking 3-4 times per week.   -     LTG 1 Progress Met  -     LTG 2 Pt to report improved energy for household and work tasks to improve QOL.   -     LTG 2 Progress Met  -CD     LTG 3 Pt independent with advanced HEP for strength, flexibility and endurance.   -     LTG 3 Progress Met  -     LTG 4 Pt to report improvement in piano playing.   -     LTG 4 Progress Partially Met  -CD     LTG 5 Soft tissue swelling/ scar tissue will be 75 % softer in right breast  -     LTG 5 Progress Partially Met  -CD       User Key  (r) = Recorded By, (t) = Taken By, (c) = Cosigned By    Initials Name Provider Type    Carolina Aleman, PT Physical Therapist          Therapy Education  Education Details: massager to help get the tissue moving in areas she cannot reach  Given: Symptoms/condition management, Posture/body mechanics  Program: Reinforced  How Provided: Verbal  Provided to: Patient  Level of Understanding: Verbalized              Time Calculation:   Start Time: 1031  Total Timed Code Minutes- PT: 29 minute(s)   Therapy Suggested Charges     Code   Minutes Charges    None           Therapy Charges for Today     Code Description Service Date Service Provider Modifiers Qty    69975055346 HC PT MANUAL THERAPY EA 15 MIN 10/23/2018 Carolina Silva, PT GP 2                    Carolina Silva, PT  10/23/2018

## 2018-10-30 ENCOUNTER — HOSPITAL ENCOUNTER (OUTPATIENT)
Dept: PHYSICAL THERAPY | Facility: HOSPITAL | Age: 58
Setting detail: THERAPIES SERIES
Discharge: HOME | End: 2018-10-30

## 2018-10-30 DIAGNOSIS — L90.5 SCAR CONDITION AND FIBROSIS OF SKIN: Primary | ICD-10-CM

## 2018-10-30 PROCEDURE — 97140 MANUAL THERAPY 1/> REGIONS: CPT

## 2018-10-30 NOTE — THERAPY TREATMENT NOTE
Outpatient Physical Therapy Lymphedema Treatment Note  [facility]     Patient Name: Jannette Burroughs  : 1960  MRN: [id number]  Today's Date: 10/30/2018        Visit Date: 10/30/2018    Visit Dx:    ICD-10-CM ICD-9-CM   1. Scar condition and fibrosis of skin L90.5 709.2       Patient Active Problem List   Diagnosis   • Malignant neoplasm of lower-outer quadrant of breast of female, estrogen receptor positive (CMS/HCC)              Lymphedema     Row Name 10/30/18 1500             Subjective Pain    Able to rate subjective pain? yes  -CD      Pre-Treatment Pain Level 0  -CD      Post-Treatment Pain Level 0  -CD         Subjective Comments    Subjective Comments Patient reported that she had some good news. She stated that she went to see her MD last week and that she is hoping to do one more fill then patient hopes to have surgery . So she is excited. she stated that the right side of her chest has been really tight since the last fluid fill and that the right side of her chest on the skin has had some skin break out on the surface  -CD         Skin Changes/Observations    Skin Observations Comment lateral chest I could feel some superficial dryness and the tissue looked a little darker but not open or red. The tissue over the right expander was really tight again.  -CD         Manual Lymphatic Drainage    Manual Lymphatic Drainage astym  -CD      Astym scar(s) / incision line(s)  -CD      Scar(s) / Incision Line(s) right  -CD      Anterior Chest right  -CD      Anterior-Lateral Chest right  -CD      Manual Lymphatic Drainage Comments soft tissue massage after to the whole right side of the chest really working the tissue over the expander and the lateral chest  -CD         Compression/Skin Care    Compression/Skin Care skin care  -CD      Skin Care moisturizing lotion applied  -CD        User Key  (r) = Recorded By, (t) = Taken By, (c) = Cosigned By    Initials Name Provider Type    NABOR Silva  Carolina GASPAR, PT Physical Therapist                              PT Assessment/Plan     Row Name 10/30/18 1601          PT Assessment    Functional Limitations Performance in self-care ADL;Performance in work activities;Limitation in home management  -CD     Impairments Impaired flexibility  -CD     Assessment Comments Still tight but patient had a nother fill since the last visit. She has soft tissue dysfunciton along the lateral side of her chest and the  tissue under the expander is tight. Post-treatment there was some movement of the skin. The lateral tissue was dry today and felt dry.  -CD        PT Plan    PT Plan Comments Continue 2 or 3 more before surgery  -CD       User Key  (r) = Recorded By, (t) = Taken By, (c) = Cosigned By    Initials Name Provider Type    Carolina Aleman, PT Physical Therapist                     Exercises     Row Name 10/30/18 1500             Subjective Comments    Subjective Comments Patient reported that she had some good news. She stated that she went to see her MD last week and that she is hoping to do one more fill then patient hopes to have surgery December 1st. So she is excited. she stated that the right side of her chest has been really tight since the last fluid fill and that the right side of her chest on the skin has had some skin break out on the surface  -CD         Subjective Pain    Able to rate subjective pain? yes  -CD      Pre-Treatment Pain Level 0  -CD      Post-Treatment Pain Level 0  -CD        User Key  (r) = Recorded By, (t) = Taken By, (c) = Cosigned By    Initials Name Provider Type    Carolina Aleman, PT Physical Therapist                            Therapy Education  Given: Symptoms/condition management, Posture/body mechanics  Program: Reinforced  How Provided: Verbal  Provided to: Patient  Level of Understanding: Verbalized              Time Calculation:   Start Time: 1001  Total Timed Code Minutes- PT: 30 minute(s)   Therapy Suggested Charges      Code   Minutes Charges    None           Therapy Charges for Today     Code Description Service Date Service Provider Modifiers Qty    95102940044 HC PT MANUAL THERAPY EA 15 MIN 10/30/2018 Carolina Silva, PT GP 2                    Carolina Silva, PT  10/30/2018

## 2018-11-13 ENCOUNTER — HOSPITAL ENCOUNTER (OUTPATIENT)
Dept: PHYSICAL THERAPY | Facility: HOSPITAL | Age: 58
Setting detail: THERAPIES SERIES
Discharge: HOME | End: 2018-11-13

## 2018-11-13 DIAGNOSIS — L90.5 SCAR CONDITION AND FIBROSIS OF SKIN: Primary | ICD-10-CM

## 2018-11-13 PROCEDURE — 97140 MANUAL THERAPY 1/> REGIONS: CPT

## 2018-11-13 NOTE — THERAPY TREATMENT NOTE
Outpatient Physical Therapy Lymphedema Treatment Note  [facility]     Patient Name: Jannette Burroughs  : 1960  MRN: [id number]  Today's Date: 2018        Visit Date: 2018    Visit Dx:    ICD-10-CM ICD-9-CM   1. Scar condition and fibrosis of skin L90.5 709.2       Patient Active Problem List   Diagnosis   • Malignant neoplasm of lower-outer quadrant of breast of female, estrogen receptor positive (CMS/HCC)        Lymphedema     Row Name 18 1500             Subjective Pain    Able to rate subjective pain?  yes  -CD      Pre-Treatment Pain Level  0  -CD      Post-Treatment Pain Level  0  -CD         Subjective Comments    Subjective Comments  Patient reported that went back to see Dr Butler last week and she was pleased with progress and did a fill. They are planning to do surgery  I believe so to continue with Pt to loosen up the area in the mean time  -CD         Skin Changes/Observations    Skin Observations Comment  less darkness on the skin and the right breast looked really tight with the implant. On palpation there was some movement in the tissue but very tight  -CD         Manual Lymphatic Drainage    Manual Lymphatic Drainage  astym  -CD      Astym  scar(s) / incision line(s)  -CD      Scar(s) / Incision Line(s)  right  -CD      Anterior Chest  right  -CD      Anterior-Lateral Chest  right  -CD      Manual Lymphatic Drainage Comments  soft tissue massage to the right chest region and over the implant after  -CD         Compression/Skin Care    Compression/Skin Care  skin care  -CD      Skin Care  moisturizing lotion applied  -CD        User Key  (r) = Recorded By, (t) = Taken By, (c) = Cosigned By    Initials Name Provider Type    Carolina Aleman PT Physical Therapist                        PT Assessment/Plan     Row Name 18 1541          PT Assessment    Functional Limitations  Performance in self-care ADL  -CD     Impairments  Impaired flexibility  -CD      Assessment Comments  It was tight in the right side of the chest and the tissue over the expander moved very little when I started treatment - there was more movement after. Soft tissue dysfunction around the tissue and the expander but it was starting to loosen up. The tissue on the top of the expander was less dark also. Good progress  -CD        PT Plan    PT Plan Comments  Patient is out of town until after thanksgiving to visit her children so to follow up then before she has surgery. Continue to progress and improve skin flexibility before surgery to loosen up the scar tissue.  -CD       User Key  (r) = Recorded By, (t) = Taken By, (c) = Cosigned By    Initials Name Provider Type    Carolina Aleman, PT Physical Therapist               Exercises     Row Name 11/13/18 1500             Subjective Comments    Subjective Comments  Patient reported that went back to see Dr Butler last week and she was pleased with progress and did a fill. They are planning to do surgery december 12th I believe so to continue with Pt to loosen up the area in the mean time  -CD         Subjective Pain    Able to rate subjective pain?  yes  -CD      Pre-Treatment Pain Level  0  -CD      Post-Treatment Pain Level  0  -CD        User Key  (r) = Recorded By, (t) = Taken By, (c) = Cosigned By    Initials Name Provider Type    Carolina Aleman, PT Physical Therapist                            Therapy Education  Given: Symptoms/condition management  Program: Reinforced  How Provided: Verbal  Provided to: Patient  Level of Understanding: Verbalized              Time Calculation:   Start Time: 1037  Total Timed Code Minutes- PT: 35 minute(s)   Therapy Suggested Charges     Code   Minutes Charges    None           Therapy Charges for Today     Code Description Service Date Service Provider Modifiers Qty    32393337539  PT MANUAL THERAPY EA 15 MIN 11/13/2018 Carolina Silva, PT GP 2                    Carolina Silva PT  11/13/2018

## 2018-11-27 ENCOUNTER — HOSPITAL ENCOUNTER (OUTPATIENT)
Dept: PHYSICAL THERAPY | Facility: HOSPITAL | Age: 58
Setting detail: THERAPIES SERIES
Discharge: HOME | End: 2018-11-27

## 2018-11-27 DIAGNOSIS — L90.5 SCAR CONDITION AND FIBROSIS OF SKIN: Primary | ICD-10-CM

## 2018-11-27 PROCEDURE — 97140 MANUAL THERAPY 1/> REGIONS: CPT

## 2018-11-27 NOTE — THERAPY DISCHARGE NOTE
"     Outpatient Physical Therapy Lymphedema Treatment Note/Discharge Summary   [facility]     Patient Name: Jannette Burroughs  : 1960  MRN: [id number]  Today's Date: 2018      Visit Date: 2018    Visit Dx:    ICD-10-CM ICD-9-CM   1. Scar condition and fibrosis of skin L90.5 709.2       Patient Active Problem List   Diagnosis   • Malignant neoplasm of lower-outer quadrant of breast of female, estrogen receptor positive (CMS/HCC)        Lymphedema     Row Name 18 1400             Subjective Pain    Able to rate subjective pain?  yes  -CD      Pre-Treatment Pain Level  0  -CD      Post-Treatment Pain Level  0  -CD         Subjective Comments    Subjective Comments  Patient reported that the right tissue on the expander is tight and the skin has peeled and there is still \"rough\" skin on the lateral side of the chest along the bra line. Patient stated that she is having surgery tomorrow   -CD         Skin Changes/Observations    Skin Observations Comment  skin was dry and superficially bumpy along the right lateral chest and the skin over the expander.  -CD         Manual Lymphatic Drainage    Manual Lymphatic Drainage  astym  -CD      Astym  scar(s) / incision line(s)  -CD      Scar(s) / Incision Line(s)  right  -CD      Anterior Chest  right  -CD      Anterior-Lateral Chest  right  -CD      Manual Lymphatic Drainage Comments  soft tissue massage to the right side of her chest after and along the right side to loosen up the skin  -CD         Compression/Skin Care    Compression/Skin Care  skin care  -CD      Skin Care  moisturizing lotion applied  -CD        User Key  (r) = Recorded By, (t) = Taken By, (c) = Cosigned By    Initials Name Provider Type    CD Carolina Silva, PT Physical Therapist                        PT Assessment/Plan     Row Name 18 8350          PT Assessment    Functional Limitations  Limitation in home management;Performance in work activities  -CD     " "Impairments  Impaired flexibility  -CD     Assessment Comments  Patient's still has some soft tissue dysfunciton laterally along the side of her chest on the right but it has continued to improve. After treatment the skin was \"looser\" over the expander.    -CD        PT Plan    PT Plan Comments  Patient is going for expander exchange surgery tomorrow therefore to discharge from [facility] physical therapy  -CD       User Key  (r) = Recorded By, (t) = Taken By, (c) = Cosigned By    Initials Name Provider Type    Carolina Aleman, PT Physical Therapist               Exercises     Row Name 11/27/18 1400             Subjective Comments    Subjective Comments  Patient reported that the right tissue on the expander is tight and the skin has peeled and there is still \"rough\" skin on the lateral side of the chest along the bra line. Patient stated that she is having surgery tomorrow november 28th  -         Subjective Pain    Able to rate subjective pain?  yes  -CD      Pre-Treatment Pain Level  0  -CD      Post-Treatment Pain Level  0  -CD        User Key  (r) = Recorded By, (t) = Taken By, (c) = Cosigned By    Initials Name Provider Type    Carolina Aleman, PT Physical Therapist                        PT OP Goals     Row Name 11/27/18 1400          PT Short Term Goals    STG Date to Achieve  11/23/18  -CD     STG 1  Pt independent with HEP for general strengthening and flexibility to promote return to PLOF.   -CD     STG 1 Progress  Met  -CD     STG 2  Pt re-evaluated for ASTYM Rt breast tightness.   -     STG 2 Progress  Met  -CD     STG 3  Soft tissue swelling/ scar tissue will be 25 % softer in right breast  -     STG 3 Progress  Met  -CD        Long Term Goals    LTG Date to Achieve  12/06/18  -CD     LTG 1  Pt to report improved exercise tolerance to resume walking 3-4 times per week.   -CD     LTG 1 Progress  Met  -CD     LTG 2  Pt to report improved energy for household and work tasks to improve QOL.  "  -CD     LTG 2 Progress  Met  -CD     LTG 3  Pt independent with advanced HEP for strength, flexibility and endurance.   -     LTG 3 Progress  Met  -CD     LTG 4  Pt to report improvement in piano playing.   -     LTG 4 Progress  Met  -CD     LTG 5  Soft tissue swelling/ scar tissue will be 75 % softer in right breast  -     LTG 5 Progress  Met  -CD       User Key  (r) = Recorded By, (t) = Taken By, (c) = Cosigned By    Initials Name Provider Type    CD Carolina Silva, PT Physical Therapist          Therapy Education  Given: Symptoms/condition management, Posture/body mechanics  Program: Reinforced  How Provided: Verbal  Provided to: Patient  Level of Understanding: Verbalized              Time Calculation:   Start Time: 1031  Total Timed Code Minutes- PT: 29 minute(s)   Therapy Suggested Charges     Code   Minutes Charges    None           Therapy Charges for Today     Code Description Service Date Service Provider Modifiers Qty    71588334511 HC PT MANUAL THERAPY EA 15 MIN 11/27/2018 Carolina Silva, PT GP 2                 OP PT Discharge Summary  Date of Discharge: 11/27/18  Reason for Discharge: All goals achieved  Outcomes Achieved: Able to achieve all goals within established timeline  Discharge Destination: Other (comment)(surgery for expander exchange)      Carolina Silva, PT  11/27/2018

## 2018-11-28 ENCOUNTER — ANESTHESIA (OUTPATIENT)
Dept: PERIOP | Facility: HOSPITAL | Age: 58
End: 2018-11-28

## 2018-11-28 ENCOUNTER — HOSPITAL ENCOUNTER (OUTPATIENT)
Facility: HOSPITAL | Age: 58
Setting detail: SURGERY ADMIT
Discharge: HOME | End: 2018-11-28
Attending: FAMILY MEDICINE | Admitting: FAMILY MEDICINE

## 2018-11-28 ENCOUNTER — ANESTHESIA EVENT (OUTPATIENT)
Dept: PERIOP | Facility: HOSPITAL | Age: 58
End: 2018-11-28

## 2018-11-28 VITALS
OXYGEN SATURATION: 99 % | HEART RATE: 88 BPM | RESPIRATION RATE: 20 BRPM | DIASTOLIC BLOOD PRESSURE: 88 MMHG | SYSTOLIC BLOOD PRESSURE: 160 MMHG | TEMPERATURE: 97 F

## 2018-11-28 LAB
B-HCG UR QL: NEGATIVE
INTERNAL NEGATIVE CONTROL: NEGATIVE
INTERNAL POSITIVE CONTROL: POSITIVE
Lab: NORMAL

## 2018-11-28 PROCEDURE — 25010000002 DEXAMETHASONE SODIUM PHOSPHATE 10 MG/ML SOLUTION 1 ML VIAL: Performed by: NURSE ANESTHETIST, CERTIFIED REGISTERED

## 2018-11-28 PROCEDURE — C1789 PROSTHESIS, BREAST, IMP: HCPCS | Performed by: FAMILY MEDICINE

## 2018-11-28 PROCEDURE — 25010000002 FENTANYL CITRATE (PF) 100 MCG/2ML SOLUTION: Performed by: NURSE ANESTHETIST, CERTIFIED REGISTERED

## 2018-11-28 PROCEDURE — 25010000002 ONDANSETRON PER 1 MG: Performed by: NURSE ANESTHETIST, CERTIFIED REGISTERED

## 2018-11-28 PROCEDURE — 25010000003 CEFAZOLIN IN DEXTROSE 2-4 GM/100ML-% SOLUTION: Performed by: FAMILY MEDICINE

## 2018-11-28 PROCEDURE — 25010000002 PROPOFOL 10 MG/ML EMULSION: Performed by: NURSE ANESTHETIST, CERTIFIED REGISTERED

## 2018-11-28 PROCEDURE — 25010000002 PHENYLEPHRINE PER 1 ML: Performed by: NURSE ANESTHETIST, CERTIFIED REGISTERED

## 2018-11-28 PROCEDURE — 25010000002 BUPRENORPHINE PER 0.1 MG: Performed by: NURSE ANESTHETIST, CERTIFIED REGISTERED

## 2018-11-28 PROCEDURE — 25010000002 EPINEPHRINE PER 0.1 MG: Performed by: FAMILY MEDICINE

## 2018-11-28 PROCEDURE — 25010000002 HYDROMORPHONE PER 4 MG: Performed by: ANESTHESIOLOGY

## 2018-11-28 PROCEDURE — 81025 URINE PREGNANCY TEST: CPT | Performed by: FAMILY MEDICINE

## 2018-11-28 PROCEDURE — 25010000002 DEXAMETHASONE PER 1 MG: Performed by: NURSE ANESTHETIST, CERTIFIED REGISTERED

## 2018-11-28 PROCEDURE — 25010000002 MIDAZOLAM PER 1 MG: Performed by: NURSE ANESTHETIST, CERTIFIED REGISTERED

## 2018-11-28 PROCEDURE — 25010000002 PROPOFOL 1000 MG/ML EMULSION: Performed by: NURSE ANESTHETIST, CERTIFIED REGISTERED

## 2018-11-28 DEVICE — BRST SIL NATRELLE INSPIRA SMOTH F/P 605CC: Type: IMPLANTABLE DEVICE | Site: BREAST | Status: FUNCTIONAL

## 2018-11-28 RX ORDER — ONDANSETRON 4 MG/1
4 TABLET, FILM COATED ORAL EVERY 8 HOURS PRN
Qty: 10 TABLET | Refills: 0 | Status: SHIPPED | OUTPATIENT
Start: 2018-11-28

## 2018-11-28 RX ORDER — BUPIVACAINE HYDROCHLORIDE 2.5 MG/ML
INJECTION, SOLUTION EPIDURAL; INFILTRATION; INTRACAUDAL
Status: DISCONTINUED | OUTPATIENT
Start: 2018-11-28 | End: 2018-11-28

## 2018-11-28 RX ORDER — MAGNESIUM HYDROXIDE 1200 MG/15ML
LIQUID ORAL AS NEEDED
Status: DISCONTINUED | OUTPATIENT
Start: 2018-11-28 | End: 2018-11-28 | Stop reason: HOSPADM

## 2018-11-28 RX ORDER — PROMETHAZINE HYDROCHLORIDE 25 MG/ML
6.25 INJECTION, SOLUTION INTRAMUSCULAR; INTRAVENOUS ONCE AS NEEDED
Status: DISCONTINUED | OUTPATIENT
Start: 2018-11-28 | End: 2018-11-28 | Stop reason: HOSPADM

## 2018-11-28 RX ORDER — FENTANYL CITRATE 50 UG/ML
INJECTION, SOLUTION INTRAMUSCULAR; INTRAVENOUS AS NEEDED
Status: DISCONTINUED | OUTPATIENT
Start: 2018-11-28 | End: 2018-11-28 | Stop reason: SURG

## 2018-11-28 RX ORDER — SODIUM CHLORIDE 0.9 % (FLUSH) 0.9 %
3 SYRINGE (ML) INJECTION EVERY 12 HOURS SCHEDULED
Status: DISCONTINUED | OUTPATIENT
Start: 2018-11-28 | End: 2018-11-28 | Stop reason: HOSPADM

## 2018-11-28 RX ORDER — PROPOFOL 10 MG/ML
VIAL (ML) INTRAVENOUS AS NEEDED
Status: DISCONTINUED | OUTPATIENT
Start: 2018-11-28 | End: 2018-11-28 | Stop reason: SURG

## 2018-11-28 RX ORDER — FAMOTIDINE 10 MG/ML
20 INJECTION, SOLUTION INTRAVENOUS ONCE
Status: CANCELLED | OUTPATIENT
Start: 2018-11-28 | End: 2018-11-28

## 2018-11-28 RX ORDER — OXYCODONE HYDROCHLORIDE AND ACETAMINOPHEN 5; 325 MG/1; MG/1
1 TABLET ORAL ONCE AS NEEDED
Status: DISCONTINUED | OUTPATIENT
Start: 2018-11-28 | End: 2018-11-28 | Stop reason: HOSPADM

## 2018-11-28 RX ORDER — GLYCOPYRROLATE 0.2 MG/ML
INJECTION INTRAMUSCULAR; INTRAVENOUS AS NEEDED
Status: DISCONTINUED | OUTPATIENT
Start: 2018-11-28 | End: 2018-11-28 | Stop reason: SURG

## 2018-11-28 RX ORDER — ONDANSETRON 2 MG/ML
INJECTION INTRAMUSCULAR; INTRAVENOUS AS NEEDED
Status: DISCONTINUED | OUTPATIENT
Start: 2018-11-28 | End: 2018-11-28 | Stop reason: SURG

## 2018-11-28 RX ORDER — TAMOXIFEN CITRATE 20 MG/1
20 TABLET ORAL DAILY
COMMUNITY

## 2018-11-28 RX ORDER — MIDAZOLAM HYDROCHLORIDE 1 MG/ML
INJECTION INTRAMUSCULAR; INTRAVENOUS AS NEEDED
Status: DISCONTINUED | OUTPATIENT
Start: 2018-11-28 | End: 2018-11-28 | Stop reason: SURG

## 2018-11-28 RX ORDER — OXYCODONE HYDROCHLORIDE 5 MG/1
5 TABLET ORAL EVERY 4 HOURS PRN
Qty: 25 TABLET | Refills: 0 | Status: SHIPPED | OUTPATIENT
Start: 2018-11-28

## 2018-11-28 RX ORDER — SODIUM CHLORIDE, SODIUM LACTATE, POTASSIUM CHLORIDE, CALCIUM CHLORIDE 600; 310; 30; 20 MG/100ML; MG/100ML; MG/100ML; MG/100ML
9 INJECTION, SOLUTION INTRAVENOUS CONTINUOUS
Status: DISCONTINUED | OUTPATIENT
Start: 2018-11-28 | End: 2018-11-28 | Stop reason: HOSPADM

## 2018-11-28 RX ORDER — IBUPROFEN 600 MG/1
600 TABLET ORAL EVERY 6 HOURS PRN
Qty: 30 TABLET | Refills: 0 | Status: SHIPPED | OUTPATIENT
Start: 2018-11-28

## 2018-11-28 RX ORDER — CEFAZOLIN SODIUM 2 G/100ML
2 INJECTION, SOLUTION INTRAVENOUS ONCE
Status: COMPLETED | OUTPATIENT
Start: 2018-11-28 | End: 2018-11-28

## 2018-11-28 RX ORDER — LIDOCAINE HYDROCHLORIDE 10 MG/ML
INJECTION, SOLUTION INFILTRATION; PERINEURAL AS NEEDED
Status: DISCONTINUED | OUTPATIENT
Start: 2018-11-28 | End: 2018-11-28 | Stop reason: SURG

## 2018-11-28 RX ORDER — NEOSTIGMINE METHYLSULFATE 5 MG/5 ML
SYRINGE (ML) INTRAVENOUS AS NEEDED
Status: DISCONTINUED | OUTPATIENT
Start: 2018-11-28 | End: 2018-11-28 | Stop reason: SURG

## 2018-11-28 RX ORDER — LIDOCAINE HYDROCHLORIDE 10 MG/ML
0.5 INJECTION, SOLUTION EPIDURAL; INFILTRATION; INTRACAUDAL; PERINEURAL ONCE AS NEEDED
Status: COMPLETED | OUTPATIENT
Start: 2018-11-28 | End: 2018-11-28

## 2018-11-28 RX ORDER — PROMETHAZINE HYDROCHLORIDE 25 MG/1
25 TABLET ORAL ONCE AS NEEDED
Status: DISCONTINUED | OUTPATIENT
Start: 2018-11-28 | End: 2018-11-28 | Stop reason: HOSPADM

## 2018-11-28 RX ORDER — ATRACURIUM BESYLATE 10 MG/ML
INJECTION, SOLUTION INTRAVENOUS AS NEEDED
Status: DISCONTINUED | OUTPATIENT
Start: 2018-11-28 | End: 2018-11-28 | Stop reason: SURG

## 2018-11-28 RX ORDER — DEXAMETHASONE SODIUM PHOSPHATE 4 MG/ML
INJECTION, SOLUTION INTRA-ARTICULAR; INTRALESIONAL; INTRAMUSCULAR; INTRAVENOUS; SOFT TISSUE AS NEEDED
Status: DISCONTINUED | OUTPATIENT
Start: 2018-11-28 | End: 2018-11-28 | Stop reason: SURG

## 2018-11-28 RX ORDER — CEPHALEXIN 500 MG/1
500 CAPSULE ORAL 4 TIMES DAILY
Qty: 40 CAPSULE | Refills: 0 | Status: SHIPPED | OUTPATIENT
Start: 2018-11-28 | End: 2018-12-08

## 2018-11-28 RX ORDER — HYDROMORPHONE HYDROCHLORIDE 1 MG/ML
0.5 INJECTION, SOLUTION INTRAMUSCULAR; INTRAVENOUS; SUBCUTANEOUS
Status: DISCONTINUED | OUTPATIENT
Start: 2018-11-28 | End: 2018-11-28 | Stop reason: HOSPADM

## 2018-11-28 RX ORDER — ONDANSETRON 2 MG/ML
4 INJECTION INTRAMUSCULAR; INTRAVENOUS ONCE AS NEEDED
Status: DISCONTINUED | OUTPATIENT
Start: 2018-11-28 | End: 2018-11-28 | Stop reason: HOSPADM

## 2018-11-28 RX ORDER — ACETAMINOPHEN 325 MG/1
650 TABLET ORAL EVERY 6 HOURS PRN
Qty: 30 TABLET | Refills: 0 | Status: SHIPPED | OUTPATIENT
Start: 2018-11-28

## 2018-11-28 RX ORDER — ACETAMINOPHEN 650 MG
TABLET, EXTENDED RELEASE ORAL AS NEEDED
Status: DISCONTINUED | OUTPATIENT
Start: 2018-11-28 | End: 2018-11-28 | Stop reason: HOSPADM

## 2018-11-28 RX ORDER — FENTANYL CITRATE 50 UG/ML
50 INJECTION, SOLUTION INTRAMUSCULAR; INTRAVENOUS
Status: DISCONTINUED | OUTPATIENT
Start: 2018-11-28 | End: 2018-11-28 | Stop reason: HOSPADM

## 2018-11-28 RX ORDER — FAMOTIDINE 20 MG/1
20 TABLET, FILM COATED ORAL ONCE
Status: COMPLETED | OUTPATIENT
Start: 2018-11-28 | End: 2018-11-28

## 2018-11-28 RX ORDER — PROMETHAZINE HYDROCHLORIDE 25 MG/1
25 SUPPOSITORY RECTAL ONCE AS NEEDED
Status: DISCONTINUED | OUTPATIENT
Start: 2018-11-28 | End: 2018-11-28 | Stop reason: HOSPADM

## 2018-11-28 RX ORDER — SODIUM CHLORIDE 0.9 % (FLUSH) 0.9 %
3-10 SYRINGE (ML) INJECTION AS NEEDED
Status: DISCONTINUED | OUTPATIENT
Start: 2018-11-28 | End: 2018-11-28 | Stop reason: HOSPADM

## 2018-11-28 RX ADMIN — PHENYLEPHRINE HYDROCHLORIDE 100 MCG: 10 INJECTION INTRAVENOUS at 12:27

## 2018-11-28 RX ADMIN — DEXAMETHASONE SODIUM PHOSPHATE 6 MG: 4 INJECTION, SOLUTION INTRAMUSCULAR; INTRAVENOUS at 10:13

## 2018-11-28 RX ADMIN — LIDOCAINE HYDROCHLORIDE 0.5 ML: 10 INJECTION, SOLUTION EPIDURAL; INFILTRATION; INTRACAUDAL; PERINEURAL at 09:25

## 2018-11-28 RX ADMIN — PROPOFOL 50 MCG/KG/MIN: 10 INJECTION, EMULSION INTRAVENOUS at 10:05

## 2018-11-28 RX ADMIN — GLYCOPYRROLATE 0.4 MG: 0.2 INJECTION, SOLUTION INTRAMUSCULAR; INTRAVENOUS at 13:16

## 2018-11-28 RX ADMIN — FENTANYL CITRATE 25 MCG: 50 INJECTION, SOLUTION INTRAMUSCULAR; INTRAVENOUS at 10:07

## 2018-11-28 RX ADMIN — HYDROMORPHONE HYDROCHLORIDE 0.5 MG: 1 INJECTION, SOLUTION INTRAMUSCULAR; INTRAVENOUS; SUBCUTANEOUS at 14:45

## 2018-11-28 RX ADMIN — DEXAMETHASONE SODIUM PHOSPHATE 30 ML: 10 INJECTION, SOLUTION INTRAMUSCULAR; INTRAVENOUS at 10:10

## 2018-11-28 RX ADMIN — ATRACURIUM BESYLATE 40 MG: 10 INJECTION, SOLUTION INTRAVENOUS at 10:07

## 2018-11-28 RX ADMIN — FAMOTIDINE 20 MG: 20 TABLET, FILM COATED ORAL at 09:25

## 2018-11-28 RX ADMIN — ONDANSETRON 4 MG: 2 INJECTION INTRAMUSCULAR; INTRAVENOUS at 12:52

## 2018-11-28 RX ADMIN — MIDAZOLAM HYDROCHLORIDE 2 MG: 1 INJECTION, SOLUTION INTRAMUSCULAR; INTRAVENOUS at 09:58

## 2018-11-28 RX ADMIN — FENTANYL CITRATE 50 MCG: 50 INJECTION, SOLUTION INTRAMUSCULAR; INTRAVENOUS at 14:20

## 2018-11-28 RX ADMIN — Medication 3 MG: at 13:16

## 2018-11-28 RX ADMIN — PHENYLEPHRINE HYDROCHLORIDE 100 MCG: 10 INJECTION INTRAVENOUS at 11:44

## 2018-11-28 RX ADMIN — FENTANYL CITRATE 50 MCG: 50 INJECTION, SOLUTION INTRAMUSCULAR; INTRAVENOUS at 14:30

## 2018-11-28 RX ADMIN — PROPOFOL 150 MG: 10 INJECTION, EMULSION INTRAVENOUS at 10:07

## 2018-11-28 RX ADMIN — SODIUM CHLORIDE, POTASSIUM CHLORIDE, SODIUM LACTATE AND CALCIUM CHLORIDE 9 ML/HR: 600; 310; 30; 20 INJECTION, SOLUTION INTRAVENOUS at 09:26

## 2018-11-28 RX ADMIN — CEFAZOLIN SODIUM 2 G: 2 INJECTION, SOLUTION INTRAVENOUS at 10:00

## 2018-11-28 RX ADMIN — PHENYLEPHRINE HYDROCHLORIDE 100 MCG: 10 INJECTION INTRAVENOUS at 12:00

## 2018-11-28 RX ADMIN — LIDOCAINE HYDROCHLORIDE 50 MG: 10 INJECTION, SOLUTION INFILTRATION; PERINEURAL at 10:07

## 2018-11-28 NOTE — ANESTHESIA PROCEDURE NOTES
ANESTHESIA INTUBATION  Urgency: elective    Date/Time: 11/28/2018 10:11 AM  Airway not difficult    General Information and Staff    Patient location during procedure: OR  CRNA: Holly Serrano CRNA    Indications and Patient Condition  Indications for airway management: airway protection    Preoxygenated: yes  MILS not maintained throughout  Mask difficulty assessment: 1 - vent by mask    Final Airway Details  Final airway type: endotracheal airway      Successful airway: ETT  Cuffed: yes   Successful intubation technique: direct laryngoscopy  Facilitating devices/methods: intubating stylet  Endotracheal tube insertion site: oral  Blade: Alla  Blade size: 3  ETT size (mm): 7.0  Cormack-Lehane Classification: grade IIa - partial view of glottis  Placement verified by: chest auscultation and capnometry   Cuff volume (mL): 7  Measured from: teeth  ETT to teeth (cm): 21  Number of attempts at approach: 1    Additional Comments  Smooth IV induction.  Atraumatic ET intubation.  Dentition unchanged.  Negative epigastric sounds, Breath sound equal bilaterally with symmetric chest rise and fall

## 2018-11-28 NOTE — ANESTHESIA PREPROCEDURE EVALUATION
Anesthesia Evaluation     Patient summary reviewed and Nursing notes reviewed                Airway   Mallampati: II  TM distance: >3 FB  Neck ROM: full  No difficulty expected  Dental - normal exam     Pulmonary - negative pulmonary ROS and normal exam   Cardiovascular - negative cardio ROS and normal exam        Neuro/Psych- negative ROS  GI/Hepatic/Renal/Endo - negative ROS     Musculoskeletal (-) negative ROS    Abdominal  - normal exam    Bowel sounds: normal.   Substance History - negative use     OB/GYN negative ob/gyn ROS         Other      history of cancer (breast)                    Anesthesia Plan    ASA 2     general   (PECS)  intravenous induction   Anesthetic plan, all risks, benefits, and alternatives have been provided, discussed and informed consent has been obtained with: patient.    Plan discussed with CRNA.

## 2018-11-28 NOTE — ANESTHESIA PROCEDURE NOTES
Peripheral Block    Pre-sedation assessment completed: 11/28/2018 10:05 AM    Patient reassessed immediately prior to procedure    Patient location during procedure: OR  Start time: 11/28/2018 10:08 AM  Stop time: 11/28/2018 10:10 AM  Reason for block: at surgeon's request and post-op pain management  Performed by  Anesthesiologist: Kelvin Musa MD  Preanesthetic Checklist  Completed: patient identified, site marked, surgical consent, pre-op evaluation, timeout performed, IV checked, risks and benefits discussed and monitors and equipment checked  Prep:  Pt Position: supine  Sterile barriers:cap, gloves, gown and mask  Prep: ChloraPrep  Patient monitoring: blood pressure monitoring, continuous pulse oximetry and EKG  Procedure  Sedation:yes  Performed under: general  Guidance:ultrasound guided and landmark technique  Images:still images obtained    Laterality:left  Block Type:PECS I and PECS II  Injection Technique:single-shot  Needle Type:short-bevel  Needle Gauge:20 G      Medications  Preservative Free Saline:10ml  Comment:Block Injection:  Total volume of LA divided between Right and Left sided blocks       Adjuncts:   Buprenorphine 0.30 mg ,Decadron 4 mg PSF    Post Assessment  Injection Assessment: negative aspiration for heme and incremental injection  Patient Tolerance:comfortable throughout block  Complications:no  Additional Notes  The pt. Was placed in the Supine Position and GA was induced     The insertion site was prepped with CHG and Ultrasound guidance with In-Plane techniquewas  a 4inch BBraun 360 degree echogenic needle was visualized.  Normal Saline PSF was  utilized for hydrodissection of tissue. PECS 1 Block- Pectoralis Major and Minor where identified and LA was injected between PMM and PmM at the level of the 3rd Rib(10ml),  PECS 2-  Pectoralis Minor and Serratus muscle where identified and the needle was advanced laterally in-plane with the 4th rib as a backstop, pleura was monitored.   LA was injected between SA and PmM at the level of 4th rib( 20ml).  LA injection spread was visualized, injection was incremental 1-5ml, normal or low injection pressure, no intravascular injection, no pneumothorax appreciated.  Thank You.

## 2018-11-28 NOTE — OP NOTE
Plastic Surgery Operative Note    Jannette Burroughs  [id number]  1960    Date of Surgery:  11/28/2018 1:38 PM    Pre-op Diagnosis: Acquired absence of the right breast, personal history of breast cancer, asymmetry between the reconstructed and natural breasts.     Post-op Diagnosis: same      Procedure: right breast exchange of tissue expander to permanent implant, extensive right breast capsulectomy, left breast mastopexy, left breast liposuction    Procedure(s):  LEFT BREAST LIFT  RIGHT EXCHANGE TISSUE EXPANDER TO IMPLANT,RIGHT CAPSULECTOMY    Surgeon(s):  Dottie Butler MD    Anesthesia: General    Surgeon: Dottie Butler MD    Assistant: LORY Cruz    Estimated Blood Loss: less than 10ml    Specimens: none      Drains: none    Findings: severe right breast capsular contracture,    Complications: none    History:  The patient is a 58-year-old with a history of breast cancer who underwent a right mastectomy and immediate placement of the tissue expander.  She subsequently required chemotherapy.  She developed severe high-grade capsular contracture of the right breast.  She has underlying moderate ptosis of the left breast and severe breast asymmetry between the native breast and the reconstructed breast.  She presents now for right breast exchange of tissue expander to permanent implant, right breast skin did periprosthetic capsulectomy, and a left breast mastopexy and other indicated procedures.  Risks benefits and alternatives to this and limitations of this were discussed with the patient preoperatively who verbalized understanding and wishes to proceed.     Description of the Procedure: The patient was brought to the operating room where she was placed supine on the operating room table.  After general endotracheal anesthesia was established, she underwent left breast pecs blocks per anesthesia.  She was then prepped and draped sterilely.  1% lidocaine with epinephrine  And 0.25% Marcaine mixed  one-to-one were injected in the planned areas inframammary incision of the right breast.  We then incised her prior mastectomy scar and removed her tissue expander.  There was severe capsular contracture surrounding the expander.  This capsule was removed and meticulous hemostasis was maintained throughout.  We then released the tissue to the margins of what would be the normal breast footprint which had been marked preoperatively.  This also included 2cm lowering her inframammary pocket to the natural position of the inframammary fold.  The pocket was irrigated with antibiotic irrigation and a sizer was used to confirm proper sizing for symmetry.  We then placed a natEdventorye inspira [id number] implant to the breast pocket.  Serial #[id number].  The incision was closed with soft tissue and deep dermal interrupted Vicryl sutures a deep running subcuticular suture as well as a running simple surface 5-0 nylon suture.  I decided to use nylon suture because of the dependent location of this incision and risk for breakdown should she develop a seroma.  Attention was then turned to the left breast where the markings for mastopexy underwent incision an de-epithelialization of the dermal glandular pedicle to the areola and leaving a 42 mm diameter areola which was her natural areolar diameter.  I then deepithelialized the areas of excess vertical and horizontal skin which appeared grossly normal.  The incision was then closed with interrupted Vicryl soft tissue sutures, deep dermal Monocryl sutures and a running subcuticular subcuticular 4-0 Monocryl suture. We then brought the areola out through a new 42 mm diameter opening and inset it with deep dermal 3-0 Monocryl sutures and a running subcuticular 4-0 Monocryl suture. Attention was turned to the lateral and superolateral aspects of the breast which appeared much martinez nayurally on the left side than they did on the right side (creating significant asymmetry because the right  breast appeared more rounded than the left breast which had more superolateral fullness and lateral fullness).  For this reason and for better symmetry, we placed lipotumescent in the areas of soft tissue fullness which been marked preoperatively and after 7 minutes (allowing the epinephrine to work) these areas were treated with liposuction with good shaping of the superolateral breast and a good match to the right breast. We did  1:1 liposuction with placement of approximately 50 mL's of lipotumescent and return of approximate 50 mL of lipoaspirate.  Any additional tumescent fluid was milked from the incision and the incision was closed with an interrupted nylon suture.  The incisions were cleaned and dried and dressed with skin affix and Steri-Strips to the areas of Monocryl closure and a coverderm to the area nylon closure of the right breast and right dressing with Adaptic and 4 x 4's.  Fluff gauze was placed over the areas of liposuction and she was placed in a postsurgical bra.  She was awakened in the operating room and transferred to recovery room in good condition.  All needle sponge and instrument counts were correct ×2 at the end of the procedure.  There were no complications from the procedure.    Dottie Butler MD     Date: 11/28/2018  Time: 1:38 PM

## 2018-11-28 NOTE — INTERVAL H&P NOTE
Pre-Op H&P  Jannette Burroughs  [id number]  1960    Chief complaint: Breast asymmetry    HPI:    Patient is a 58 y.o.female who presents with a history of breast cancer and is here today for breast reconstruction. She has elected to proceed with a left breast mastopexy, exchange right tissue expander to implant, and right capsulectomy.    Review of Systems:  General ROS: negative for chills, fever or skin lesions;  No changes since last office visit  Cardiovascular ROS: no chest pain or dyspnea on exertion  Respiratory ROS: no cough, shortness of breath, or wheezing    Allergies: No Known Allergies    Home Meds:    No current facility-administered medications on file prior to encounter.      Current Outpatient Medications on File Prior to Encounter   Medication Sig Dispense Refill   • tamoxifen (NOLVADEX) 20 MG chemo tablet Take 20 mg by mouth Daily.     • aspirin (ASPIRIN CHILDRENS) 81 MG chewable tablet Chew 1 tablet Daily. 30 tablet 11   • cephalexin (KEFLEX) 500 MG capsule      • dexamethasone (DECADRON) 4 MG tablet Take 2 tablets in the morning the day after chemo (Day 2), then take 2 tablets twice daily on days 3 & 4.  Take with food. 10 tablet 3   • lidocaine-prilocaine (EMLA) 2.5-2.5 % cream Apply  topically As Needed (prior to port access). 30 g 3   • ondansetron (ZOFRAN) 8 MG tablet Take 1 tablet by mouth 3 (Three) Times a Day As Needed for Nausea or Vomiting. 30 tablet 5       PMH:   Past Medical History:   Diagnosis Date   • Breast cancer (CMS/HCC)      PSH:    Past Surgical History:   Procedure Laterality Date   • D&C AND LAPAROSCOPY           Social History:   Tobacco:   Social History     Tobacco Use   Smoking Status Never Smoker   Smokeless Tobacco Never Used      Alcohol:     Social History     Substance and Sexual Activity   Alcohol Use No       Vitals:           /70 (BP Location: Left arm, Patient Position: Lying)   Pulse 76   Temp 96.9 °F (36.1 °C) (Temporal)   Resp 16   SpO2 99%      Physical Exam:  General Appearance:    Alert, cooperative, no distress, appears stated age   Head:    Normocephalic, without obvious abnormality, atraumatic   Lungs:     Clear to auscultation bilaterally, respirations unlabored    Heart:   Regular rate and rhythm, S1 and S2 normal, no murmur, rub    or gallop    Abdomen:    Soft, non-tender, +bowel sounds   Breast Exam:    deferred   Genitalia:    deferred   Extremities:   Extremities normal, atraumatic, no cyanosis or edema   Skin:   Skin color, texture, turgor normal, no rashes or lesions   Neurologic:   Grossly intact   Results Review  I have reviewed the patient's recent clinical results.    Cancer Staging (if applicable)  Cancer Patient: _X_ yes __no __unknown; If yes, clinical stage T:__ N:__M:__, stage group or __N/A    Impression: Breast asymmetry    Plan: Left breast mastopexy, exchange right tissue expander to implant, and right capsulectomy      Sharifa Garcia, VALDEMAR   11/28/2018   9:28 AM

## 2018-11-28 NOTE — ANESTHESIA POSTPROCEDURE EVALUATION
Patient: Jannette Burroughs    Procedure Summary     Date:  11/28/18 Room / Location:   [facility]    Anesthesia Start:  1000 Anesthesia Stop:  1339    Procedures:       LEFT BREAST LIFT (Left Breast)      RIGHT EXCHANGE TISSUE EXPANDER TO IMPLANT,RIGHT CAPSULECTOMY (Right Breast) Diagnosis:      Surgeon:  Dottie Butler MD Provider:  Kelvin Musa MD    Anesthesia Type:  general ASA Status:  2          Anesthesia Type: general  Last vitals  BP   136/76 (11/28/18 1335)   Temp   97 °F (36.1 °C) (11/28/18 1333)   Pulse   87 (11/28/18 1335)   Resp   18 (11/28/18 1335)     SpO2   100 % (11/28/18 1335)     Post Anesthesia Care and Evaluation    Patient location during evaluation: PACU  Patient participation: complete - patient participated  Level of consciousness: awake and alert  Pain score: 0  Pain management: adequate  Airway patency: patent  Anesthetic complications: No anesthetic complications  PONV Status: none  Cardiovascular status: hemodynamically stable and acceptable  Respiratory status: nonlabored ventilation, acceptable and nasal cannula  Hydration status: acceptable    Comments: Pt transported to PACU with O2 via nasal cannula at 4 L/MIN. Vital signs stable.  Pt shows no signs of distress.  Report given to PACU RN. /76 (BP Location: Right leg)   Pulse 87   Temp 97 °F (36.1 °C) (Temporal)   Resp 18   SpO2 100%

## 2019-01-23 ENCOUNTER — TRANSCRIBE ORDERS (OUTPATIENT)
Dept: PHYSICAL THERAPY | Facility: HOSPITAL | Age: 59
End: 2019-01-23

## 2019-01-23 DIAGNOSIS — T85.44XA CAPSULAR CONTRACTURE OF BREAST IMPLANT, INITIAL ENCOUNTER: Primary | ICD-10-CM

## 2019-01-28 ENCOUNTER — HOSPITAL ENCOUNTER (OUTPATIENT)
Dept: PHYSICAL THERAPY | Facility: HOSPITAL | Age: 59
Setting detail: THERAPIES SERIES
Discharge: HOME | End: 2019-01-28
Attending: FAMILY MEDICINE

## 2019-01-28 DIAGNOSIS — T85.44XA CAPSULAR CONTRACTURE OF BREAST IMPLANT, INITIAL ENCOUNTER: ICD-10-CM

## 2019-01-28 DIAGNOSIS — L90.5 SCAR CONDITION AND FIBROSIS OF SKIN: Primary | ICD-10-CM

## 2019-01-28 PROCEDURE — 97161 PT EVAL LOW COMPLEX 20 MIN: CPT | Performed by: PHYSICAL THERAPIST

## 2019-01-28 PROCEDURE — 97140 MANUAL THERAPY 1/> REGIONS: CPT | Performed by: PHYSICAL THERAPIST

## 2019-01-29 NOTE — THERAPY EVALUATION
Physical Therapy Lymphedema Initial Evaluation  [facility]     Patient Name: Jannette Burroughs  : 1960  MRN: [id number]  Today's Date: 2019      Visit Date: 2019    Visit Dx:    ICD-10-CM ICD-9-CM   1. Scar condition and fibrosis of skin L90.5 709.2   2. Capsular contracture of breast implant, initial encounter T85.44XA 611.83       Patient Active Problem List   Diagnosis   • Malignant neoplasm of lower-outer quadrant of breast of female, estrogen receptor positive (CMS/HCC)        Past Medical History:   Diagnosis Date   • Breast cancer (CMS/HCC)         Past Surgical History:   Procedure Laterality Date   • BREAST RECONSTRUCTION, BREAST TISSUE EXPANDER INSERTION Right 2017    Procedure: IMMEDIATE STAGED RIGHT BREAST RECONSTRUCTION WITH TISSUE EXPANDER AND ALLODERM;  Surgeon: Dottie Butler MD;  Location: [facility];  Service:    • BREAST RECONSTRUCTION, BREAST TISSUE EXPANDER REMOVAL, IMPLANT INSERTION Right 2018    Procedure: RIGHT EXCHANGE TISSUE EXPANDER TO IMPLANT,RIGHT CAPSULECTOMY;  Surgeon: Dottie Butler MD;  Location: [facility] OR;  Service: Plastics   • D&C AND LAPAROSCOPY     • MASTECTOMY WITH SENTINEL NODE BIOPSY AND AXILLARY NODE DISSECTION Right 2017    Procedure: RIGHT NIPPLE SPARING BREAST MASTECTOMY WITH AXILLARY NODE DISSECTION;  Surgeon: Cristobal Luna MD;  Location: [facility] OR;  Service:    • MASTOPEXY Left 2018    Procedure: LEFT BREAST LIFT;  Surgeon: Dottie Butler MD;  Location: [facility] OR;  Service: Plastics       Visit Dx:    ICD-10-CM ICD-9-CM   1. Scar condition and fibrosis of skin L90.5 709.2   2. Capsular contracture of breast implant, initial encounter T85.44XA 611.83       Patient History     Row Name 19 1500          Chief Complaint  Tightness;Swelling;Other 1 (comment) Dimpling of reconstructed Rt breast    -MW    Date Current Problem(s) Began  19  -    Brief Description of Current Complaint  Pt reports  exchange surgery / implant placement on November 28, 2018.  She has noticed some increased tightness of the Rt breast with tissue dimples and puckers in several areas. She denies pain, but would like to try more PT to improve the comfort and aesthetics of her reconstruction. Dr Butler also recommended ROGER to assist with soft tissue/ scar tissue remodeling.   -MW    Previous treatment for THIS PROBLEM  Rehabilitation ROGER, QUINTIN, EDMAR during expander phase   -MW    Patient/Caregiver Goals  Other (comment);Decrease swelling Improve movement, smoothness, comfort Rt breast   -MW    Patient's Rating of General Health  Good  -MW    Hand Dominance  right-handed  -MW    Occupation/sports/leisure activities  Dentist; managing office and providing pt care  -MW    Patient seeing anyone else for problem(s)?  Plastics   -MW    How has patient tried to help current problem?  Gentle self massage   -MW    Are you or can you be pregnant  No  -MW          Pain at Present  0  -MW    Pain at Best  0  -MW    Is your sleep disturbed?  No  -MW          Any falls in the past year:  No  -MW          Primary Language  English  -MW    Are you able to read  Yes  -MW    Are you able to write  Yes  -MW    How does patient learn best?  Listening;Reading;Demonstration  -MW    Teaching needs identified  Home Exercise Program;Management of Condition  -MW    Patient is concerned about/has problems with  Grasping objects lifting;Coordination;Performing home management (household chores, shopping, care of dependents);Performing job responsibilities/community activities (work, school,;Reaching over head;Repetitive movements of the hand, arm, shoulder;Standing;Walking;Writing/grasping items with hand(s)  -MW    Does patient have problems with the following?  Other (comment) not reported; possible depression   -MW    Barriers to learning  None  -MW    Pt Participated in POC and Goals  Yes  -MW          Are you being hurt, hit, or frightened by anyone at  home or in your life?  No  -MW    Are you being neglected by a caregiver  No  -MW      User Key  (r) = Recorded By, (t) = Taken By, (c) = Cosigned By    Initials Name Provider Type    Dee Singh, PT Physical Therapist          Lymphedema     Row Name 01/28/19 1500          Able to rate subjective pain?  yes  -MW    Pre-Treatment Pain Level  0  -MW    Post-Treatment Pain Level  0  -MW          Subjective Comments  Pt requesting ASTYM to help smooth appearance of Rt breast reconstruction; also tissue feels tight   -MW          Lymphedema Classification  Trunk:;secondary;stage 1 (Spontaneously Reversible) Rt breast   -MW    Lymphedema Cancer Related Sx  right;simple mastectomy;sentinel node biopsy  -MW    Lymphedema Surgery Comments  Implant exchange 11/28/18   -MW    Lymph Nodes Removed #  2  -MW    Positive Lymph Nodes #  0  -MW    Chemo Received  yes  -MW    Chemo Treatments #/Timeframe  2018  -MW    Adverse Chemo Reactions/Complication  peripheral neuropathy   -MW    Radiation Therapy Received  no  -MW    Lymphedema Assessment Comments  Trace to mild edema Rt breast   -MW          GENERAL ROM COMMENTS  WFL; pt satisfied with UE flexibility   -MW          Edema Assessment Comment  Mild edema along inferior aspect of Rt breast, adjacent to incision line; mild fullness superior -lateral aspect   -MW          Location/Assessment  Upper Quadrant  -MW    Upper Quadrant Conditions  right:;intact;clean  -MW    Upper Quadrant Color/Pigment  right:;left:  -MW    Upper Quadrant Skin Details  Rt incision line with thickened tissue, edema and rough appearance. LT thin smooth line.   -MW    Skin Observations Comment  Skin over implant is dry   -MW          Manual Lymphatic Drainage  astym;initial sequence;opened regional lymph nodes  -MW    Initial Sequence  supraclavicular  -MW    Supraclavicular  right  -MW    Opened Regional Lymph Nodes  axillary  -MW    Axillary  right  -MW    Astym  scar(s) / incision  line(s);anterior-lateral chest  -    Scar(s) / Incision Line(s)  Star burst with isolator   -MW    Anterior Chest  right  -MW    Anterior-Lateral Chest  right  -MW    Anterior-Lateral Chest Comment  In supine, localizer working around breast mount, with focus at inferior medial to inferior and then lateral aspects, to upper outer aspect. Extra strokes with isolator at areas of increased soft tissue restrictions (puckers and dimples). Repeated with arm over head and tissues more on stretch.   -MW    Manual Lymphatic Drainage Comments  Brief MLD post ASTYM / STM   -MW    Manual Therapy  STM with tissue lifting and space correction techniques around breast from 4:00 to 11:00.   -MW          Compression/Skin Care  skin care  -MW    Skin Care  moisturizing lotion applied residual cocoa butter   -      User Key  (r) = Recorded By, (t) = Taken By, (c) = Cosigned By    Initials Name Provider Type    Dee Singh, PT Physical Therapist            01/28/19 1500   PT Assessment   Functional Limitations Performance in self-care ADL   Impairments Impaired flexibility;Impaired lymphatic circulation   Assessment Comments Pt presents with soft tissue dysfunction and restrictions around Rt breast reconstruction. She also presents with mild edema adjacent to incision line. She has functional ROM and feels she is able to complete her work, but is concerned about the tightness of the implant and the puckering of the skin in several places laterally, superior-laterally and medial-inferiorly. Overall the implant is area is tight / tense skin. Expect improvement with PT for manual therapy including ASTYM and STM; we also discussed possible use of silicone or kinesiotape for additional scar tissue remodeling options. Recommend scar mature more fully before using either option.    Rehab Potential Good   Patient/caregiver participated in establishment of treatment plan and goals Yes   Patient would benefit from skilled therapy  intervention Yes   PT Plan   PT Frequency 1x/week   Predicted Duration of Therapy Intervention (Therapy Eval) 12 weeks    Planned CPT's? PT EVAL LOW COMPLEXITY: 74525;PT MANUAL THERAPY EA 15 MIN: 02550;PT THER PROC EA 15 MIN: 52855;PT SELF CARE/HOME MGMT/TRAIN EA 15: 46028   Physical Therapy Interventions (Optional Details) manual therapy techniques;manual lymphatic drainage;patient/family education;home exercise program;taping   PT Plan Comments Cont ASTYM, STM, MLD; monitor skin for possible use of silicone or KT                      Therapy Education  Education Details: Self massage: open supraclavicular and axillary nodes Rt; circles down lateral trunk towards inguinal watershed. Gentle breast massage especially along inferior aspect/ scar line with Vit E.   Given: Symptoms/condition management, Edema management  Program: New  How Provided: Verbal, Demonstration  Provided to: Patient  Level of Understanding: Teach back education performed, Verbalized                        PT OP Goals     Row Name 01/28/19 1500          STG Date to Achieve  02/25/19  -MW    STG 1  Pt to report at least 25 % improvement in Rt breast soft tissue mobility   -MW    STG 1 Progress  New  -MW    STG 2  Soft tissue swelling/ scar tissue will be 25 % softer in right breast  -MW    STG 2 Progress  New  -MW    STG 3  Pt independent with self lymph massage and gentle soft tissue massage   -MW    STG 3 Progress  New  -MW          LTG 1  Pt to report at least 50 % improvement in Rt breast soft tissue mobility   -MW    LTG 1 Progress  New  -MW    LTG 2  Soft tissue swelling/ scar tissue will be 75 % softer in right breast  -MW    LTG 2 Progress  New  -MW    LTG 3  Pt to report improved satisfaction of asthetics of Rt breast reconstruction; smoother appearance.   -MW    LTG 3 Progress  New  -MW          PT Goal Re-Cert Due Date  04/28/19  -MW      User Key  (r) = Recorded By, (t) = Taken By, (c) = Cosigned By    Initials Name Provider Type     MW Dee Chapin, PT Physical Therapist                 Outcome Measure Options: Disabilities of the Arm, Shoulder, and Hand (DASH)(pt delinced to complete; no deficits in function )         Time Calculation:   Start Time: 1500  Total Timed Code Minutes- PT: 30 minute(s)   Therapy Suggested Charges     Code   Minutes Charges    59028 (CPT®) Hc Pt Neuromusc Re Education Ea 15 Min      84192 (CPT®) Hc Pt Ther Proc Ea 15 Min      01010 (CPT®) Hc Gait Training Ea 15 Min      19998 (CPT®) Hc Pt Therapeutic Act Ea 15 Min      82735 (CPT®) Hc Pt Manual Therapy Ea 15 Min 30 2    44753 (CPT®) Hc Pt Ther Massage- Per 15 Min      95480 (CPT®) Hc Pt Iontophoresis Ea 15 Min      00571 (CPT®) Hc Pt Elec Stim Ea-Per 15 Min      21701 (CPT®) Hc Pt Ultrasound Ea 15 Min      63169 (CPT®) Hc Pt Self Care/Mgmt/Train Ea 15 Min      70842 (CPT®) Hc Pt Prosthetic (S) Train Initial Encounter, Each 15 Min      19228 (CPT®) Hc Orthotic(S) Mgmt/Train Initial Encounter, Each 15min      00361 (CPT®) Hc Pt Aquatic Therapy Ea 15 Min      90124 (CPT®) Hc Pt Orthotic(S)/Prosthetic(S) Encounter, Each 15 Min       (CPT®) Hc Pt Electrical Stim Unattended      Total  30 2        Therapy Charges for Today     Code Description Service Date Service Provider Modifiers Qty    81884312794 HC PT MANUAL THERAPY EA 15 MIN 1/28/2019 Dee Chapin, PT GP 2    38144719353 HC PT EVAL LOW COMPLEXITY 4 1/28/2019 Dee Chapin, PT GP 1          PT G-Codes  Outcome Measure Options: Disabilities of the Arm, Shoulder, and Hand (DASH)(pt delinced to complete; no deficits in function )         Dee Chapin, AGGIE  1/29/2019

## 2019-02-04 ENCOUNTER — HOSPITAL ENCOUNTER (OUTPATIENT)
Dept: PHYSICAL THERAPY | Facility: HOSPITAL | Age: 59
Setting detail: THERAPIES SERIES
Discharge: HOME | End: 2019-02-04
Attending: FAMILY MEDICINE

## 2019-02-04 DIAGNOSIS — T85.44XA CAPSULAR CONTRACTURE OF BREAST IMPLANT, INITIAL ENCOUNTER: ICD-10-CM

## 2019-02-04 DIAGNOSIS — L90.5 SCAR CONDITION AND FIBROSIS OF SKIN: Primary | ICD-10-CM

## 2019-02-04 PROCEDURE — 97140 MANUAL THERAPY 1/> REGIONS: CPT | Performed by: PHYSICAL THERAPIST

## 2019-02-04 NOTE — THERAPY TREATMENT NOTE
Outpatient Physical Therapy Lymphedema Treatment Note  [facility]     Patient Name: Jannette Burroughs  : 1960  MRN: [id number]  Today's Date: 2019        Visit Date: 2019    Visit Dx:    ICD-10-CM ICD-9-CM   1. Scar condition and fibrosis of skin L90.5 709.2   2. Capsular contracture of breast implant, initial encounter T85.44XA 611.83       Patient Active Problem List   Diagnosis   • Malignant neoplasm of lower-outer quadrant of breast of female, estrogen receptor positive (CMS/HCC)        Lymphedema     Row Name 19 1415          Able to rate subjective pain?  yes  -MW    Pre-Treatment Pain Level  0  -MW    Post-Treatment Pain Level  0  -MW          Subjective Comments  Pt reports traveling to visit her daughter tomorrow. Overall breast is somewhat softer.   -MW       Edema Assessment Comment  Mild edema along inferior aspect of Rt breast, adjacent to incision line; mild fullness superior -lateral aspect   -MW          Location/Assessment  Upper Quadrant  -MW    Upper Quadrant Conditions  right:;intact;clean  -MW    Upper Quadrant Color/Pigment  right:;left:  -MW    Upper Quadrant Skin Details  Rt incision line smoother, less thickened. Superior dimple smoother; dimple - restriction at 7:00 position stable   -MW          Manual Lymphatic Drainage  astym;initial sequence;opened regional lymph nodes  -MW    Initial Sequence  supraclavicular  -MW    Supraclavicular  right  -MW    Opened Regional Lymph Nodes  axillary  -MW    Axillary  right  -MW    Astym  scar(s) / incision line(s);anterior-lateral chest  -MW    Scar(s) / Incision Line(s)  Star burst with isolator   -MW    Anterior Chest  right  -MW    Anterior-Lateral Chest  right  -MW    Anterior-Lateral Chest Comment  In supine, localizer working around breast mount, with focus at inferior medial to inferior and then lateral aspects, to upper outer aspect. Extra strokes with isolator at areas of increased soft tissue restrictions (puckers  and dimples). Repeated with arm over head utilizing evaluator and localizer. Additional strokes with localizer and isolator at dimpled / restricted areas at 11:00 and 7:00 positions.    -MW    Manual Lymphatic Drainage Comments  Brief MLD post ASTYM / STM   -MW    Manual Therapy  STM with tissue lifting and space correction techniques around breast from 4:00 to 11:00; with extra focus at dimpled areas at 11 and 7:00.   -MW          Compression/Skin Care  skin care  -MW    Skin Care  moisturizing lotion applied residual cocoa butter   -MW      User Key  (r) = Recorded By, (t) = Taken By, (c) = Cosigned By    Initials Name Provider Type    Dee Singh, PT Physical Therapist                        PT Assessment/Plan     Row Name 02/04/19 6561          PT Assessment    Functional Limitations  Performance in self-care ADL  -MW     Impairments  Impaired flexibility;Impaired lymphatic circulation  -MW     Assessment Comments  Rt breast reconstruction slightly softer and more mobile than at eval; and improved post tx. Increased ASTYM strokes and STM to continue to improve tissue mobility, position and comfort of implant. Expect to add Kinesiotape to assist to lift and loosen dimpled areas / soft tissue restrictions.   -MW     Rehab Potential  Good  -MW     Patient/caregiver participated in establishment of treatment plan and goals  Yes  -MW     Patient would benefit from skilled therapy intervention  Yes  -MW        PT Plan    PT Frequency  1x/week  -MW     Physical Therapy Interventions (Optional Details)  manual therapy techniques;manual lymphatic drainage;patient/family education;home exercise program;taping  -MW     PT Plan Comments  Cont ASTYM, STM, MLD; monitor skin for possible use of silicone or KT   -MW       User Key  (r) = Recorded By, (t) = Taken By, (c) = Cosigned By    Initials Name Provider Type    Dee Singh, PT Physical Therapist               Exercises     Row Name 02/04/19 7460           Subjective Comments    Subjective Comments  Pt reports traveling to visit her daughter tomorrow. Overall breast is somewhat softer.   -MW        Subjective Pain    Able to rate subjective pain?  yes  -MW     Pre-Treatment Pain Level  0  -MW     Post-Treatment Pain Level  0  -MW        Total Minutes    70410 - PT Manual Therapy Minutes  30       User Key  (r) = Recorded By, (t) = Taken By, (c) = Cosigned By    Initials Name Provider Type    Dee Singh, PT Physical Therapist                            Therapy Education  Education Details: Discussed adding Kinesiotape to lift dimpled areas; pt agreeable but will be out of town starting tomorrow and would prefer to be home to add tape. Pt to cont self massage.   Given: Symptoms/condition management, Edema management  Program: Modified  How Provided: Verbal  Provided to: Patient  Level of Understanding: Verbalized              Time Calculation:   Start Time: 1415  Total Timed Code Minutes- PT: 35 minute(s)   Therapy Suggested Charges     Code   Minutes Charges    30337 (CPT®) Hc Pt Neuromusc Re Education Ea 15 Min      58921 (CPT®) Hc Pt Ther Proc Ea 15 Min      30981 (CPT®) Hc Gait Training Ea 15 Min      13393 (CPT®) Hc Pt Therapeutic Act Ea 15 Min      01894 (CPT®) Hc Pt Manual Therapy Ea 15 Min 35 2    26062 (CPT®) Hc Pt Ther Massage- Per 15 Min      48969 (CPT®) Hc Pt Iontophoresis Ea 15 Min      65242 (CPT®) Hc Pt Elec Stim Ea-Per 15 Min      23590 (CPT®) Hc Pt Ultrasound Ea 15 Min      97939 (CPT®) Hc Pt Self Care/Mgmt/Train Ea 15 Min      69984 (CPT®) Hc Pt Prosthetic (S) Train Initial Encounter, Each 15 Min      42063 (CPT®) Hc Orthotic(S) Mgmt/Train Initial Encounter, Each 15min      49286 (CPT®) Hc Pt Aquatic Therapy Ea 15 Min      53970 (CPT®) Hc Pt Orthotic(S)/Prosthetic(S) Encounter, Each 15 Min       (CPT®) Hc Pt Electrical Stim Unattended      Total  35 2        Therapy Charges for Today     Code Description Service Date Service Provider  Modifiers Qty    [id number]  PT MANUAL THERAPY EA 15 MIN 2/4/2019 Dee Chapin, PT GP 2                    eDe Chapin, PT  2/4/2019

## 2019-02-14 ENCOUNTER — HOSPITAL ENCOUNTER (OUTPATIENT)
Dept: PHYSICAL THERAPY | Facility: HOSPITAL | Age: 59
Setting detail: THERAPIES SERIES
Discharge: HOME | End: 2019-02-14
Attending: FAMILY MEDICINE

## 2019-02-14 DIAGNOSIS — L90.5 SCAR CONDITION AND FIBROSIS OF SKIN: Primary | ICD-10-CM

## 2019-02-14 DIAGNOSIS — T85.44XA CAPSULAR CONTRACTURE OF BREAST IMPLANT, INITIAL ENCOUNTER: ICD-10-CM

## 2019-02-14 PROCEDURE — 97140 MANUAL THERAPY 1/> REGIONS: CPT | Performed by: PHYSICAL THERAPIST

## 2019-02-14 NOTE — THERAPY TREATMENT NOTE
Outpatient Physical Therapy Lymphedema Treatment Note   [facility]     Patient Name: Jannette Burroughs  : 1960  MRN: [id number]  Today's Date: 2019        Visit Date: 2019    Visit Dx:    ICD-10-CM ICD-9-CM   1. Scar condition and fibrosis of skin L90.5 709.2   2. Capsular contracture of breast implant, initial encounter T85.44XA 611.83       Patient Active Problem List   Diagnosis   • Malignant neoplasm of lower-outer quadrant of breast of female, estrogen receptor positive (CMS/HCC)        Lymphedema     Row Name 19 1500          Able to rate subjective pain?  yes  -MW    Pre-Treatment Pain Level  0  -MW    Post-Treatment Pain Level  0  -MW          Subjective Comments  Felt more stiff after sitting on airplane but did not notice any swelling or other symptoms; just general stiffness   -MW          Edema Assessment Comment  Mild edema along inferior aspect of Rt breast, adjacent to incision line; mild fullness mid-lateral aspect.  -MW          Location/Assessment  Upper Quadrant  -MW    Upper Quadrant Conditions  right:;intact;clean  -MW    Upper Quadrant Color/Pigment  right:;left:  -MW    Upper Quadrant Skin Details  Superior lateral dimple persistent; dimple at 7:00 position also stable.    -MW          Manual Lymphatic Drainage  astym;initial sequence;opened regional lymph nodes  -MW    Initial Sequence  supraclavicular  -MW    Supraclavicular  right  -MW    Opened Regional Lymph Nodes  axillary  -MW    Axillary  right  -MW    Astym  scar(s) / incision line(s);anterior-lateral chest  -MW    Scar(s) / Incision Line(s)  Star burst with isolator   -MW    Anterior Chest  right  -MW    Anterior-Lateral Chest  right  -MW    Anterior-Lateral Chest Comment  In supine, evaluator and localizer working around breast mound, with focus at inferior medial to inferior and then lateral aspects, to superior lateral aspect. Extra strokes with isolator at areas of increased soft tissue restrictions  (puckers and dimples). Repeated with arm over head utilizing evaluator and localizer. Additional strokes with localizer and isolator at dimpled / restricted areas at 11:00 and 7:00 positions.    -MW    Manual Lymphatic Drainage Comments  Brief MLD post ASTYM / STM   -MW    Manual Therapy  STM with tissue bending, lifting, and space correction techniques around breast from 4:00 to 11:00; with extra focus at dimpled areas at 11 and 7:00. Also focused STM and MLD at medial area of thickened tissue superior to incision line.   -MW          Compression/Skin Care  skin care  -MW    Skin Care  moisturizing lotion applied residual cocoa butter   -MW    Compression/Skin Care Comments  Reviewed use of Silicone scar sheeting to remodel scar tissue / smooth areas especially along incision line.   -MW      User Key  (r) = Recorded By, (t) = Taken By, (c) = Cosigned By    Initials Name Provider Type    MW Dee Chapin, PT Physical Therapist                        PT Assessment/Plan     Row Name 02/14/19 1500          PT Assessment    Functional Limitations  Performance in self-care ADL  -MW     Impairments  Impaired flexibility;Impaired lymphatic circulation  -MW     Assessment Comments  Rt breast capusular contracture slowly improving with overall improved mobility of breast. Soft tissue restrictions persistent along superior lateral aspect (long dimple at 11:00), small pucker (at 7:00) as well as thickened tissue at medial end of incision line. Restriction at medial aspect of incision line smoother post STM/MLD. Other areas (11:00 and 7:00) are less responsive to tx. Pt to try silicone sheet to incision line between PT sessions. Expect to add Kinesiotape next visit; but did not tape today due to MD follow up tomorrow.   -MW     Rehab Potential  Good  -MW     Patient/caregiver participated in establishment of treatment plan and goals  Yes  -MW     Patient would benefit from skilled therapy intervention  Yes  -MW        PT  Plan    PT Frequency  1x/week  -MW     Physical Therapy Interventions (Optional Details)  manual therapy techniques;manual lymphatic drainage;patient/family education;home exercise program;taping  -MW     PT Plan Comments  Cont ASTYM, STM, MLD; ck effectiveness of silicone sheet; trial of KT?   -MW       User Key  (r) = Recorded By, (t) = Taken By, (c) = Cosigned By    Initials Name Provider Type    MW Dee Chapin, PT Physical Therapist               Exercises     Row Name 02/14/19 1500             Subjective Comments    Subjective Comments  Felt more stiff after sitting on airplane but did not notice any swelling or other symptoms; just general stiffness   -MW         Subjective Pain    Able to rate subjective pain?  yes  -MW      Pre-Treatment Pain Level  0  -MW      Post-Treatment Pain Level  0  -MW         Total Minutes    40365 - PT Manual Therapy Minutes  40  -MW        User Key  (r) = Recorded By, (t) = Taken By, (c) = Cosigned By    Initials Name Provider Type    MW Dee Chapin, PT Physical Therapist                       Manual Rx (last 36 hours)      Manual Treatments     Row Name 02/14/19 1500             Total Minutes    74379 - PT Manual Therapy Minutes  40  -MW        User Key  (r) = Recorded By, (t) = Taken By, (c) = Cosigned By    Initials Name Provider Type    Dee Singh, PT Physical Therapist              Therapy Education  Education Details: Pt has follow up with Dr Smith (new plastic surgeon) so held KT to soft tissue restrictions. Provided sample and instructions for silicone sheet for scar tissue remodeling.   Given: Symptoms/condition management, Edema management  Program: Modified, Progressed  How Provided: Verbal, Demonstration  Provided to: Patient  Level of Understanding: Verbalized              Time Calculation:   Start Time: 1500  Total Timed Code Minutes- PT: 40 minute(s)   Therapy Suggested Charges     Code   Minutes Charges    31900 (CPT®)  Pt Neuromusc Re  Education Ea 15 Min      20451 (CPT®) Hc Pt Ther Proc Ea 15 Min      28974 (CPT®) Hc Gait Training Ea 15 Min      73550 (CPT®) Hc Pt Therapeutic Act Ea 15 Min      58043 (CPT®) Hc Pt Manual Therapy Ea 15 Min 40 3    37839 (CPT®) Hc Pt Ther Massage- Per 15 Min      55963 (CPT®) Hc Pt Iontophoresis Ea 15 Min      31254 (CPT®) Hc Pt Elec Stim Ea-Per 15 Min      16959 (CPT®) Hc Pt Ultrasound Ea 15 Min      69215 (CPT®) Hc Pt Self Care/Mgmt/Train Ea 15 Min      82958 (CPT®) Hc Pt Prosthetic (S) Train Initial Encounter, Each 15 Min      20322 (CPT®) Hc Orthotic(S) Mgmt/Train Initial Encounter, Each 15min      35421 (CPT®) Hc Pt Aquatic Therapy Ea 15 Min      60092 (CPT®) Hc Pt Orthotic(S)/Prosthetic(S) Encounter, Each 15 Min       (CPT®) Hc Pt Electrical Stim Unattended      Total  40 3        Therapy Charges for Today     Code Description Service Date Service Provider Modifiers Qty    07329373047 HC PT MANUAL THERAPY EA 15 MIN 2/14/2019 Dee Chapin, PT GP 3                    Dee Chapin, PT  2/14/2019

## 2019-02-19 ENCOUNTER — HOSPITAL ENCOUNTER (OUTPATIENT)
Dept: PHYSICAL THERAPY | Facility: HOSPITAL | Age: 59
Setting detail: THERAPIES SERIES
Discharge: HOME | End: 2019-02-19
Attending: FAMILY MEDICINE

## 2019-02-19 DIAGNOSIS — T85.44XA CAPSULAR CONTRACTURE OF BREAST IMPLANT, INITIAL ENCOUNTER: ICD-10-CM

## 2019-02-19 DIAGNOSIS — L90.5 SCAR CONDITION AND FIBROSIS OF SKIN: Primary | ICD-10-CM

## 2019-02-19 PROCEDURE — 97140 MANUAL THERAPY 1/> REGIONS: CPT | Performed by: PHYSICAL THERAPIST

## 2019-02-19 NOTE — THERAPY TREATMENT NOTE
Outpatient Physical Therapy Lymphedema Treatment Note   [facility]     Patient Name: Jannette Burroughs  : 1960  MRN: [id number]  Today's Date: 2019        Visit Date: 2019    Visit Dx:    ICD-10-CM ICD-9-CM   1. Scar condition and fibrosis of skin L90.5 709.2   2. Capsular contracture of breast implant, initial encounter T85.44XA 611.83       Patient Active Problem List   Diagnosis   • Malignant neoplasm of lower-outer quadrant of breast of female, estrogen receptor positive (CMS/HCC)        Lymphedema     Row Name 19 1100          Able to rate subjective pain?  yes  -MW    Pre-Treatment Pain Level  0  -MW    Post-Treatment Pain Level  0  -MW          Subjective Comments  Reports saw Dr Smith; new plastic surgeon who suggested a revision of the Rt reconstruction to better match nipple level with LT side. Pt reports she would prefer to not have another major sugery; was thinking about some fat grafting to fill dimple area in superior lateral aspect of breast.   -MW          Edema Assessment Comment  Mild edema along inferior aspect of breast; adjacent to incision line   -MW          Location/Assessment  Upper Quadrant  -MW    Upper Quadrant Conditions  right:;intact;clean  -MW    Upper Quadrant Color/Pigment  right:;left:  -MW    Upper Quadrant Skin Details  Superior lateral dimple persistent but slightly smoother; dimple at 7:00 position less tight / smoother. Inferior incision line also smoother (less central grove area); medial end of incision line also less thickened superior aspect     -MW    Skin Observations Comment  Skin dry; improved with cocoa butter for ASTYM   -MW          Manual Lymphatic Drainage  astym;initial sequence;opened regional lymph nodes  -MW    Initial Sequence  supraclavicular  -MW    Supraclavicular  right  -MW    Opened Regional Lymph Nodes  axillary  -MW    Axillary  right  -MW    Astym  scar(s) / incision line(s);anterior-lateral chest  -MW    Scar(s) / Incision  Line(s)  Star burst with isolator; multiple passes   -MW    Anterior Chest  right  -MW    Anterior-Lateral Chest  right  -MW    Anterior-Lateral Chest Comment  In supine, evaluator and localizer working around breast mound, with focus at inferior medial to inferior and then lateral aspects, to superior lateral aspect. Extra strokes with isolator at areas of increased soft tissue restrictions (puckers and dimples). Repeated with arm over head utilizing evaluator and localizer. Additional strokes with localizer and isolator at dimpled / restricted areas at 11:00 and 7:00 positions.    -MW    Manual Lymphatic Drainage Comments  Brief MLD post ASTYM / STM   -MW    Manual Therapy  STM with tissue bending, lifting, and space correction techniques around breast from 4:00 to 11:00; with extra focus at dimpled areas at 11 and 7:00. Also focused STM and MLD at medial area of thickened tissue superior to incision line.   -MW          Compression/Skin Care  skin care  -MW    Skin Care  moisturizing lotion applied residual cocoa butter   -MW      User Key  (r) = Recorded By, (t) = Taken By, (c) = Cosigned By    Initials Name Provider Type    MW Dee Chapin, PT Physical Therapist                        PT Assessment/Plan     Row Name 02/19/19 1100          PT Assessment    Functional Limitations  Performance in self-care ADL  -MW     Impairments  Impaired flexibility;Impaired lymphatic circulation  -MW     Assessment Comments  Rt breast reconstruction continues to slowly remodel scar tissue with gradual smoothing of dimples at 11:00 and 7:00 positions. Inferior breast mild swelling presists, but also softer. Medial scar thickening in superior aspect smaller than last visit and further reduced after tx this visit; making gradual progress. Subtle improvements overall, along incision line. Cont PT STM and pt to cont silicone scar remodeling sheeting.   -MW     Rehab Potential  Good  -MW     Patient/caregiver participated in  establishment of treatment plan and goals  Yes  -MW     Patient would benefit from skilled therapy intervention  Yes  -MW        PT Plan    PT Frequency  1x/week  -MW     Physical Therapy Interventions (Optional Details)  manual therapy techniques;manual lymphatic drainage;patient/family education;home exercise program;taping  -MW     PT Plan Comments  Cont ASTYM, STM, MLD; monitor silicone sheeting   -MW       User Key  (r) = Recorded By, (t) = Taken By, (c) = Cosigned By    Initials Name Provider Type    Dee Singh, PT Physical Therapist               Exercises     Row Name 02/19/19 1100          Subjective Comments    Subjective Comments  Reports saw Dr Smith; new plastic surgeon who suggested a revision of the Rt reconstruction to better match nipple level with LT side. Pt reports she would prefer to not have another major sugery; was thinking about some fat grafting to fill dimple area in superior lateral aspect of breast.   -MW        Subjective Pain    Able to rate subjective pain?  yes  -MW     Pre-Treatment Pain Level  0  -MW     Post-Treatment Pain Level  0  -MW        Total Minutes    19841 - PT Manual Therapy Minutes  40        User Key  (r) = Recorded By, (t) = Taken By, (c) = Cosigned By    Initials Name Provider Type    Dee Singh, PT Physical Therapist                       Manual Rx (last 36 hours)      Manual Treatments     Row Name 02/19/19 1538             Total Minutes    55792 - PT Manual Therapy Minutes  40  -MW        User Key  (r) = Recorded By, (t) = Taken By, (c) = Cosigned By    Initials Name Provider Type    Dee Singh, PT Physical Therapist              Therapy Education  Education Details: Cont use of silicone scar sheet; cont gentle self massage.   Given: Symptoms/condition management, Edema management  Program: Reinforced  How Provided: Verbal  Provided to: Patient  Level of Understanding: Verbalized              Time Calculation:   Start Time:  1100  Total Timed Code Minutes- PT: 40 minute(s)   Therapy Suggested Charges     Code   Minutes Charges    27123 (CPT®) Hc Pt Neuromusc Re Education Ea 15 Min      83419 (CPT®) Hc Pt Ther Proc Ea 15 Min      75365 (CPT®) Hc Gait Training Ea 15 Min      34184 (CPT®) Hc Pt Therapeutic Act Ea 15 Min      10496 (CPT®) Hc Pt Manual Therapy Ea 15 Min 40 3    11412 (CPT®) Hc Pt Ther Massage- Per 15 Min      90951 (CPT®) Hc Pt Iontophoresis Ea 15 Min      59922 (CPT®) Hc Pt Elec Stim Ea-Per 15 Min      09221 (CPT®) Hc Pt Ultrasound Ea 15 Min      90985 (CPT®) Hc Pt Self Care/Mgmt/Train Ea 15 Min      46103 (CPT®) Hc Pt Prosthetic (S) Train Initial Encounter, Each 15 Min      00415 (CPT®) Hc Orthotic(S) Mgmt/Train Initial Encounter, Each 15min      28768 (CPT®) Hc Pt Aquatic Therapy Ea 15 Min      43657 (CPT®) Hc Pt Orthotic(S)/Prosthetic(S) Encounter, Each 15 Min       (CPT®) Hc Pt Electrical Stim Unattended      Total  40 3        Therapy Charges for Today     Code Description Service Date Service Provider Modifiers Qty    25972271872 HC PT MANUAL THERAPY EA 15 MIN 2/19/2019 Dee Chapin, PT GP 3                    Dee Chapin, PT  2/19/2019

## 2019-03-07 ENCOUNTER — HOSPITAL ENCOUNTER (OUTPATIENT)
Dept: PHYSICAL THERAPY | Facility: HOSPITAL | Age: 59
Setting detail: THERAPIES SERIES
Discharge: HOME | End: 2019-03-07
Attending: FAMILY MEDICINE

## 2019-03-07 DIAGNOSIS — T85.44XA CAPSULAR CONTRACTURE OF BREAST IMPLANT, INITIAL ENCOUNTER: ICD-10-CM

## 2019-03-07 DIAGNOSIS — L90.5 SCAR CONDITION AND FIBROSIS OF SKIN: Primary | ICD-10-CM

## 2019-03-07 PROCEDURE — 97140 MANUAL THERAPY 1/> REGIONS: CPT | Performed by: PHYSICAL THERAPIST

## 2019-03-07 NOTE — THERAPY PROGRESS REPORT/RE-CERT
Outpatient Physical Therapy Lymphedema Progress Note   [facility]     Patient Name: Jannette Burroughs  : 1960  MRN: [id number]  Today's Date: 3/7/2019        Visit Date: 2019    Visit Dx:    ICD-10-CM ICD-9-CM   1. Scar condition and fibrosis of skin L90.5 709.2   2. Capsular contracture of breast implant, initial encounter T85.44XA 611.83       Patient Active Problem List   Diagnosis   • Malignant neoplasm of lower-outer quadrant of breast of female, estrogen receptor positive (CMS/HCC)        Lymphedema     Row Name 19 1100          Able to rate subjective pain?  yes  -MW    Pre-Treatment Pain Level  0  -MW    Post-Treatment Pain Level  0  -MW          Subjective Comments  Notes some heaviness in Rt axilla intermittently.   -MW          Edema Assessment Comment  Mild edema Rt breast especially inferior aspect   -MW          Location/Assessment  Upper Quadrant  -MW    Upper Quadrant Conditions  right:;intact;clean  -MW    Upper Quadrant Color/Pigment  right:;left:  -MW    Upper Quadrant Skin Details  Superior lateral dimple persistent but slightly smoother; dimple at 7:00 position less tight / smoother (minimally observable, more obvious to palpation). Inferior incision line smoother (less central grove area); medial end of incision line with mild thickening superior aspect     -MW          Manual Lymphatic Drainage  astym;initial sequence;opened regional lymph nodes;opened anastamoses  -MW    Initial Sequence  supraclavicular  -MW    Supraclavicular  right  -MW    Opened Regional Lymph Nodes  axillary;inguinal  -MW    Axillary  right  -MW    Inguinal  right  -MW    Opened Anastamoses  axillo-inguinal  -MW    Axillo-Inguinal  right  -MW    Astym  scar(s) / incision line(s);anterior-lateral chest  -MW    Scar(s) / Incision Line(s)  Star burst with isolator; multiple passes   -MW    Anterior Chest  right  -MW    Anterior-Lateral Chest  right  -MW    Anterior-Lateral Chest Comment  In supine,  localizer working around breast mound, with focus at inferior medial to inferior and then lateral aspects, to superior lateral aspect. Extra strokes with isolator at areas of increased soft tissue restrictions (puckers and dimples). Repeated with arm over head utilizing localizer and isolator. Additional strokes with localizer and isolator at dimpled / restricted areas at 11:00 and 7:00 positions.    -MW    Manual Lymphatic Drainage Comments  MLD to Rt lateral trunk post STM/ ASTYM   -MW    Manual Therapy  STM with tissue bending, lifting, and space correction techniques around breast from 4:00 to 11:00; with extra focus at dimpled areas at 11 and 7:00. Also focused STM and MLD at medial area of thickened tissue superior to incision line.   -MW          Compression/Skin Care  skin care  -MW    Skin Care  moisturizing lotion applied residual cocoa butter   -MW      User Key  (r) = Recorded By, (t) = Taken By, (c) = Cosigned By    Initials Name Provider Type    MW Dee Chapin, PT Physical Therapist                        PT Assessment/Plan     Row Name 03/07/19 1100          PT Assessment    Functional Limitations  Performance in self-care ADL  -MW     Impairments  Impaired flexibility;Impaired lymphatic circulation  -MW     Assessment Comments  Rt breast reconstruction continues to gradually soften and smooth areas of soft tissue restrictions. Dimple at 7:00 position is >75% improved. Dimple/ restriction at 11:00 is approximately 50% improved. Inferior incision line scarring and soft tissue restriction is at least 50% improved; smoother and more mobile. Cont PT to progress toward remaining goals.   -MW     Rehab Potential  Good  -MW     Patient/caregiver participated in establishment of treatment plan and goals  Yes  -MW     Patient would benefit from skilled therapy intervention  Yes  -MW        PT Plan    PT Frequency  1x/week  -MW     Predicted Duration of Therapy Intervention (Therapy Eval)  8 weeks   -MW      Planned CPT's?  PT MANUAL THERAPY EA 15 MIN: 75110;PT THER PROC EA 15 MIN: 62703;PT SELF CARE/MGMT/TRAIN 15 MIN: 05989  -MW     Physical Therapy Interventions (Optional Details)  manual therapy techniques;manual lymphatic drainage;patient/family education;home exercise program;taping  -MW     PT Plan Comments  Cont ASTYM, STM, MLD; monitor silicone sheeting; Kinesiotape after vacation.  -MW       User Key  (r) = Recorded By, (t) = Taken By, (c) = Cosigned By    Initials Name Provider Type    Dee Singh, PT Physical Therapist               Exercises     Row Name 03/07/19 1100             Subjective Comments    Subjective Comments  Notes some heaviness in Rt axilla intermittently.   -MW         Subjective Pain    Able to rate subjective pain?  yes  -MW      Pre-Treatment Pain Level  0  -MW      Post-Treatment Pain Level  0  -MW         Total Minutes    24158 - PT Manual Therapy Minutes  45  -MW        User Key  (r) = Recorded By, (t) = Taken By, (c) = Cosigned By    Initials Name Provider Type    Dee Singh, PT Physical Therapist                       Manual Rx (last 36 hours)      Manual Treatments     Row Name 03/07/19 1100             Total Minutes    73790 - PT Manual Therapy Minutes  45  -MW        User Key  (r) = Recorded By, (t) = Taken By, (c) = Cosigned By    Initials Name Provider Type    Dee iSngh, PT Physical Therapist          PT OP Goals     Row Name 03/07/19 1100          PT Short Term Goals    STG Date to Achieve  02/25/19  -MW     STG 1  Pt to report at least 25 % improvement in Rt breast soft tissue mobility   -MW     STG 1 Progress  Met  -MW     STG 2  Soft tissue swelling/ scar tissue will be 25 % softer in right breast  -MW     STG 2 Progress  Met  -MW     STG 3  Pt independent with self lymph massage and gentle soft tissue massage   -MW     STG 3 Progress  Partially Met  -MW        Long Term Goals    LTG 1  Pt to report at least 50 % improvement in Rt breast soft  tissue mobility   -MW     LTG 1 Progress  Ongoing  -MW     LTG 2  Soft tissue swelling/ scar tissue will be 75 % softer in right breast  -MW     LTG 2 Progress  Partially Met;Ongoing  -MW     LTG 3  Pt to report improved satisfaction of asthetics of Rt breast reconstruction; smoother appearance.   -MW     LTG 3 Progress  Ongoing;Progressing  -MW        Time Calculation    PT Goal Re-Cert Due Date  04/28/19  -MW       User Key  (r) = Recorded By, (t) = Taken By, (c) = Cosigned By    Initials Name Provider Type    Dee Singh, PT Physical Therapist          Therapy Education  Education Details: Self MLD utilizing Rt axillo-inguinal anastamosis  Given: Edema management, Symptoms/condition management  Program: Progressed, Reinforced  How Provided: Verbal  Provided to: Patient  Level of Understanding: Verbalized              Time Calculation:   Start Time: 1100  Total Timed Code Minutes- PT: 45 minute(s)   Therapy Suggested Charges     Code   Minutes Charges    54403 (CPT®) Hc Pt Neuromusc Re Education Ea 15 Min      69955 (CPT®) Hc Pt Ther Proc Ea 15 Min      94325 (CPT®) Hc Gait Training Ea 15 Min      04657 (CPT®) Hc Pt Therapeutic Act Ea 15 Min      46248 (CPT®) Hc Pt Manual Therapy Ea 15 Min 45 3    68136 (CPT®) Hc Pt Ther Massage- Per 15 Min      86100 (CPT®) Hc Pt Iontophoresis Ea 15 Min      15069 (CPT®) Hc Pt Elec Stim Ea-Per 15 Min      00733 (CPT®) Hc Pt Ultrasound Ea 15 Min      46098 (CPT®) Hc Pt Self Care/Mgmt/Train Ea 15 Min      87974 (CPT®) Hc Pt Prosthetic (S) Train Initial Encounter, Each 15 Min      51013 (CPT®) Hc Orthotic(S) Mgmt/Train Initial Encounter, Each 15min      26298 (CPT®) Hc Pt Aquatic Therapy Ea 15 Min      55610 (CPT®) Hc Pt Orthotic(S)/Prosthetic(S) Encounter, Each 15 Min       (CPT®) Hc Pt Electrical Stim Unattended      Total  45 3        Therapy Charges for Today     Code Description Service Date Service Provider Modifiers Qty    68250245849 HC PT MANUAL THERAPY EA 15  MIN 3/7/2019 Dee Chapin, PT GP 3                    Dee Chapin, PT  3/7/2019

## 2019-04-09 ENCOUNTER — DOCUMENTATION (OUTPATIENT)
Dept: PHYSICAL THERAPY | Facility: HOSPITAL | Age: 59
End: 2019-04-09

## 2019-04-09 DIAGNOSIS — T85.44XA CAPSULAR CONTRACTURE OF BREAST IMPLANT, INITIAL ENCOUNTER: ICD-10-CM

## 2019-04-09 DIAGNOSIS — L90.5 SCAR CONDITION AND FIBROSIS OF SKIN: Primary | ICD-10-CM

## 2019-04-09 NOTE — THERAPY DISCHARGE NOTE
Outpatient Physical Therapy Lymphedema Discharge Summary       Patient Name: Jannette Burroughs  : 1960  MRN: [id number]  Today's Date: 2019      Visit Date: 2019    Visit Dx:    ICD-10-CM ICD-9-CM   1. Scar condition and fibrosis of skin L90.5 709.2   2. Capsular contracture of breast implant, initial encounter T85.44XA 611.83       Patient Active Problem List   Diagnosis   • Malignant neoplasm of lower-outer quadrant of breast of female, estrogen receptor positive (CMS/HCC)                      PT OP Goals     Row Name 19 0900          STG Date to Achieve  19  -MW    STG 1  Pt to report at least 25 % improvement in Rt breast soft tissue mobility   -MW    STG 1 Progress  Met  -MW    STG 2  Soft tissue swelling/ scar tissue will be 25 % softer in right breast  -MW    STG 2 Progress  Met  -MW    STG 3  Pt independent with self lymph massage and gentle soft tissue massage   -MW    STG 3 Progress  Partially Met  -MW          LTG 1  Pt to report at least 50 % improvement in Rt breast soft tissue mobility   -MW    LTG 1 Progress  Ongoing  -MW    LTG 2  Soft tissue swelling/ scar tissue will be 75 % softer in right breast  -MW    LTG 2 Progress  Partially Met;Ongoing  -MW    LTG 3  Pt to report improved satisfaction of asthetics of Rt breast reconstruction; smoother appearance.   -MW    LTG 3 Progress  Ongoing;Progressing  -MW      User Key  (r) = Recorded By, (t) = Taken By, (c) = Cosigned By    Initials Name Provider Type    Dee Singh, PT Physical Therapist                            OP PT Discharge Summary  Date of Discharge: 19  Reason for Discharge: Patient/Caregiver request  Outcomes Achieved: Patient able to partially acheive established goals  Discharge Destination: Home with home program  Discharge Instructions/Additional Comments: Pt called to cancel due to work schedule; unable to return for at least 6 weeks. Pt will obtain new referral and call to schedule when  and if she desires to return. Pt to cont with self massage, silicsone sheet to cont to promote scar tissue remodeling.       Dee Chapin, PT  4/9/2019

## 2024-04-09 LAB
CYTO UR: NORMAL
LAB AP CASE REPORT: NORMAL
LAB AP CLINICAL INFORMATION: NORMAL
LAB AP OUTSIDE REPORT, ADDENDUM: NORMAL
LAB AP SPECIAL STAINS: NORMAL
Lab: NORMAL
PATH REPORT.FINAL DX SPEC: NORMAL
PATH REPORT.GROSS SPEC: NORMAL

## (undated) DEVICE — BLAKE SILICONE DRAIN, 15 FR ROUND, HUBLESS WITH 3/16" TROCAR: Brand: BLAKE

## (undated) DEVICE — AIRWY 90MM NO9

## (undated) DEVICE — GLV SURG TRIUMPH ORTHO W/ALOE PF LTX 7.5 STRL

## (undated) DEVICE — SKIN AFFIX SURG ADHESIVE 72/CS 0.55ML: Brand: MEDLINE

## (undated) DEVICE — NDL HYPO ECLPS SFTY 25G 1 1/2IN

## (undated) DEVICE — SUT MNCRYL PLS ANTIB UD 3/0 PS2 27IN

## (undated) DEVICE — 3M™ STERI-STRIP™ REINFORCED ADHESIVE SKIN CLOSURES, R1546, 1/4 IN X 4 IN (6 MM X 100 MM), 10 STRIPS/ENVELOPE: Brand: 3M™ STERI-STRIP™

## (undated) DEVICE — CANNULA,OXY,ADULT,SUPERSOFT,W/7'TUB,UC: Brand: MEDLINE

## (undated) DEVICE — SOL LR 1000ML

## (undated) DEVICE — ELECTRD BLD EDGE/INSUL1P 2.4X5.1MM STRL

## (undated) DEVICE — MEDI-VAC NON-CONDUCTIVE SUCTION TUBING: Brand: CARDINAL HEALTH

## (undated) DEVICE — 3M™ STERI-STRIP™ REINFORCED ADHESIVE SKIN CLOSURES, R1547, 1/2 IN X 4 IN (12 MM X 100 MM), 6 STRIPS/ENVELOPE: Brand: 3M™ STERI-STRIP™

## (undated) DEVICE — SPNG GZ WOVN 4X4IN 12PLY 10/BX STRL

## (undated) DEVICE — SUT ETHLN 4/0 PS2 PLSTC 1667G

## (undated) DEVICE — LEX GENERAL BREAST: Brand: MEDLINE INDUSTRIES, INC.

## (undated) DEVICE — DRSNG WND BORDR/ADHS NONADHR/GZ LF 2X2IN STRL

## (undated) DEVICE — DISH PETRI 3.5IN MD STRL LF

## (undated) DEVICE — DISPOSABLE BIPOLAR FORCEPS 7 3/4" (19.7CM) SCOVILLE BAYONET, INSULATED, 1.5MM TIP AND 12 FT. (3.6M) CABLE: Brand: KIRWAN

## (undated) DEVICE — ANTIBACTERIAL UNDYED BRAIDED (POLYGLACTIN 910), SYNTHETIC ABSORBABLE SUTURE: Brand: COATED VICRYL

## (undated) DEVICE — BANDAGE,GAUZE,BULKEE II,4.5"X4.1YD,STRL: Brand: MEDLINE

## (undated) DEVICE — PK CHST BRST 10

## (undated) DEVICE — SUT ETHLN 5/0 P3 18IN 698G

## (undated) DEVICE — ST EXT MICROBORE FIX M LL 38IN

## (undated) DEVICE — BRA COMPR SURG STL958 LG

## (undated) DEVICE — DRSNG WND GZ CURAD OIL EMULSION 3X8IN STRL PK/3

## (undated) DEVICE — CAMERA/LASER ARM DRAPE: Brand: DEROYAL

## (undated) DEVICE — SUT MNCRYL PLS ANTIB UD 4/0 PS2 18IN

## (undated) DEVICE — DRSNG ADAPTIC 3X8

## (undated) DEVICE — SYR LUERLOK 50ML

## (undated) DEVICE — JACKSON-PRATT 100CC BULB RESERVOIR: Brand: CARDINAL HEALTH

## (undated) DEVICE — DECANT BG O JET

## (undated) DEVICE — COVER,LIGHT HANDLE,FLX,1/PK: Brand: MEDLINE INDUSTRIES, INC.

## (undated) DEVICE — SUT PDS 3/0 SH 27IN CLR PDP416H

## (undated) DEVICE — MEDI-VAC YANKAUER SUCTION HANDLE W/BULBOUS TIP: Brand: CARDINAL HEALTH

## (undated) DEVICE — SUT SILK 2/0 PS 18IN 1588H

## (undated) DEVICE — GOWN,NON-REINFORCED,SIRUS,SET IN SLV,XL: Brand: MEDLINE

## (undated) DEVICE — GLV SURG SENSICARE W/ALOE PF LF SZ6 STRL

## (undated) DEVICE — SUT MONOCRYL PLS ANTIB UND 3/0  PS1 27IN

## (undated) DEVICE — ELECTRD BLD EXT EDGE 1P COAT 6.5IN STRL

## (undated) DEVICE — SPNG LAP PREWSH SFTPK 18X18IN STRL PK/5

## (undated) DEVICE — HDRST POSTIN FM CRDL TRACH SLOT 9X8X4IN

## (undated) DEVICE — SUT SILK 3/0 TIES 18IN A184H

## (undated) DEVICE — BRA COMPR SURG STL958 MD

## (undated) DEVICE — CONTAINER,SPECIMEN,OR STERILE,4OZ: Brand: MEDLINE

## (undated) DEVICE — GLV SURG SENSICARE W/ALOE PF LF 6.5 STRL

## (undated) DEVICE — ELECTRD BLD EXT EDGE/INSUL 1P 4IN

## (undated) DEVICE — GLV SURG BIOGEL LTX PF 6